# Patient Record
Sex: MALE | Race: WHITE | NOT HISPANIC OR LATINO | Employment: FULL TIME | URBAN - METROPOLITAN AREA
[De-identification: names, ages, dates, MRNs, and addresses within clinical notes are randomized per-mention and may not be internally consistent; named-entity substitution may affect disease eponyms.]

---

## 2023-02-04 PROBLEM — E55.9 VITAMIN D DEFICIENCY: Status: ACTIVE | Noted: 2023-02-04

## 2023-02-04 PROBLEM — E87.6 HYPOKALEMIA: Status: ACTIVE | Noted: 2023-02-04

## 2023-02-04 PROBLEM — R07.89 ATYPICAL CHEST PAIN: Status: ACTIVE | Noted: 2023-02-04

## 2023-02-04 PROBLEM — I10 HYPERTENSION: Status: ACTIVE | Noted: 2023-02-04

## 2023-02-04 PROBLEM — C34.90 MALIGNANT NEOPLASM OF LUNG (MULTI): Status: ACTIVE | Noted: 2023-02-04

## 2023-02-04 PROBLEM — L30.9 ECZEMA: Status: ACTIVE | Noted: 2023-02-04

## 2023-02-04 PROBLEM — R19.7 DIARRHEA: Status: ACTIVE | Noted: 2023-02-04

## 2023-02-04 PROBLEM — R53.83 FATIGUE: Status: ACTIVE | Noted: 2023-02-04

## 2023-02-04 PROBLEM — R73.01 ELEVATED FASTING BLOOD SUGAR: Status: ACTIVE | Noted: 2023-02-04

## 2023-02-04 PROBLEM — Z90.49 STATUS POST APPENDECTOMY: Status: ACTIVE | Noted: 2023-02-04

## 2023-02-04 PROBLEM — R00.0 TACHYCARDIA: Status: ACTIVE | Noted: 2023-02-04

## 2023-02-04 PROBLEM — M25.50 MULTIPLE JOINT PAIN: Status: ACTIVE | Noted: 2023-02-04

## 2023-02-04 PROBLEM — E66.9 OBESITY: Status: ACTIVE | Noted: 2023-02-04

## 2023-02-04 PROBLEM — Z20.822 CONTACT WITH AND (SUSPECTED) EXPOSURE TO COVID-19: Status: RESOLVED | Noted: 2023-02-04 | Resolved: 2023-02-04

## 2023-02-04 PROBLEM — R20.2 PARESTHESIAS: Status: ACTIVE | Noted: 2023-02-04

## 2023-02-04 PROBLEM — Z20.822 CONTACT WITH AND (SUSPECTED) EXPOSURE TO COVID-19: Status: ACTIVE | Noted: 2023-02-04

## 2023-02-04 PROBLEM — E79.0 HYPERURICEMIA: Status: ACTIVE | Noted: 2023-02-04

## 2023-02-04 PROBLEM — F41.9 ANXIETY: Status: ACTIVE | Noted: 2023-02-04

## 2023-02-04 PROBLEM — R50.9 FEVER: Status: ACTIVE | Noted: 2023-02-04

## 2023-02-04 PROBLEM — R05.9 COUGH: Status: ACTIVE | Noted: 2023-02-04

## 2023-02-04 PROBLEM — B35.3 TINEA PEDIS: Status: ACTIVE | Noted: 2023-02-04

## 2023-02-04 PROBLEM — R19.5 POSITIVE FECAL IMMUNOCHEMICAL TEST: Status: ACTIVE | Noted: 2023-02-04

## 2023-02-04 PROBLEM — E78.1 HIGH TRIGLYCERIDES: Status: ACTIVE | Noted: 2023-02-04

## 2023-02-04 PROBLEM — R00.2 HEART PALPITATIONS: Status: ACTIVE | Noted: 2023-02-04

## 2023-02-04 PROBLEM — R74.8 ELEVATED LIVER ENZYMES: Status: ACTIVE | Noted: 2023-02-04

## 2023-02-04 PROBLEM — E11.9 TYPE 2 DIABETES MELLITUS (MULTI): Status: ACTIVE | Noted: 2023-02-04

## 2023-02-04 PROBLEM — M10.9 GOUT: Status: ACTIVE | Noted: 2023-02-04

## 2023-02-04 PROBLEM — E78.5 HYPERLIPIDEMIA: Status: ACTIVE | Noted: 2023-02-04

## 2023-02-04 PROBLEM — R14.0 ABDOMINAL BLOATING: Status: ACTIVE | Noted: 2023-02-04

## 2023-02-04 PROBLEM — K21.9 GERD (GASTROESOPHAGEAL REFLUX DISEASE): Status: ACTIVE | Noted: 2023-02-04

## 2023-02-04 PROBLEM — K80.20 GALLSTONES: Status: ACTIVE | Noted: 2023-02-04

## 2023-02-04 PROBLEM — R19.5 POSITIVE COLORECTAL CANCER SCREENING USING COLOGUARD TEST: Status: ACTIVE | Noted: 2023-02-04

## 2023-02-04 PROBLEM — K70.0 ALCOHOLIC FATTY LIVER: Status: ACTIVE | Noted: 2023-02-04

## 2023-02-04 RX ORDER — METFORMIN HYDROCHLORIDE 500 MG/1
500 TABLET, EXTENDED RELEASE ORAL DAILY
COMMUNITY
Start: 2022-08-03 | End: 2023-05-25 | Stop reason: DRUGHIGH

## 2023-02-04 RX ORDER — ESOMEPRAZOLE MAGNESIUM 40 MG/1
40 CAPSULE, DELAYED RELEASE ORAL DAILY
COMMUNITY
Start: 2016-01-13 | End: 2023-10-30

## 2023-02-04 RX ORDER — METOPROLOL SUCCINATE 25 MG/1
25 TABLET, EXTENDED RELEASE ORAL DAILY
COMMUNITY
Start: 2020-08-24 | End: 2023-08-14

## 2023-02-04 RX ORDER — AMLODIPINE BESYLATE 5 MG/1
5 TABLET ORAL DAILY
COMMUNITY
Start: 2015-04-23 | End: 2023-09-25

## 2023-02-04 RX ORDER — CHLORTHALIDONE 25 MG/1
25 TABLET ORAL DAILY
COMMUNITY
Start: 2014-09-17 | End: 2023-04-11

## 2023-02-04 RX ORDER — LORAZEPAM 1 MG/1
1 TABLET ORAL 2 TIMES DAILY
COMMUNITY
Start: 2014-03-03 | End: 2023-03-29 | Stop reason: SDUPTHER

## 2023-02-04 RX ORDER — POTASSIUM CHLORIDE 750 MG/1
1 CAPSULE, EXTENDED RELEASE ORAL DAILY
COMMUNITY
Start: 2022-05-03 | End: 2023-04-17

## 2023-03-20 ENCOUNTER — APPOINTMENT (OUTPATIENT)
Dept: PRIMARY CARE | Facility: CLINIC | Age: 63
End: 2023-03-20
Payer: COMMERCIAL

## 2023-03-27 ENCOUNTER — TELEPHONE (OUTPATIENT)
Dept: PRIMARY CARE | Facility: CLINIC | Age: 63
End: 2023-03-27
Payer: COMMERCIAL

## 2023-03-27 DIAGNOSIS — F41.9 ANXIETY: ICD-10-CM

## 2023-03-29 RX ORDER — LORAZEPAM 1 MG/1
1 TABLET ORAL 2 TIMES DAILY
Qty: 60 TABLET | Refills: 1 | Status: SHIPPED | OUTPATIENT
Start: 2023-03-29 | End: 2023-08-28 | Stop reason: WASHOUT

## 2023-03-30 ENCOUNTER — APPOINTMENT (OUTPATIENT)
Dept: PRIMARY CARE | Facility: CLINIC | Age: 63
End: 2023-03-30
Payer: COMMERCIAL

## 2023-04-11 DIAGNOSIS — I10 PRIMARY HYPERTENSION: Primary | ICD-10-CM

## 2023-04-11 RX ORDER — CHLORTHALIDONE 25 MG/1
TABLET ORAL
Qty: 30 TABLET | Refills: 11 | Status: SHIPPED | OUTPATIENT
Start: 2023-04-11 | End: 2024-04-02

## 2023-04-15 DIAGNOSIS — I10 PRIMARY HYPERTENSION: Primary | ICD-10-CM

## 2023-04-17 RX ORDER — POTASSIUM CHLORIDE 750 MG/1
CAPSULE, EXTENDED RELEASE ORAL
Qty: 90 CAPSULE | Refills: 3 | Status: SHIPPED | OUTPATIENT
Start: 2023-04-17 | End: 2024-04-02

## 2023-05-17 ENCOUNTER — APPOINTMENT (OUTPATIENT)
Dept: PRIMARY CARE | Facility: CLINIC | Age: 63
End: 2023-05-17
Payer: COMMERCIAL

## 2023-05-24 PROBLEM — Z90.49 STATUS POST APPENDECTOMY: Status: RESOLVED | Noted: 2023-02-04 | Resolved: 2023-05-24

## 2023-05-24 PROBLEM — R53.83 FATIGUE: Status: RESOLVED | Noted: 2023-02-04 | Resolved: 2023-05-24

## 2023-05-24 PROBLEM — R07.89 ATYPICAL CHEST PAIN: Status: RESOLVED | Noted: 2023-02-04 | Resolved: 2023-05-24

## 2023-05-24 PROBLEM — R00.2 HEART PALPITATIONS: Status: RESOLVED | Noted: 2023-02-04 | Resolved: 2023-05-24

## 2023-05-24 PROBLEM — R20.2 PARESTHESIAS: Status: RESOLVED | Noted: 2023-02-04 | Resolved: 2023-05-24

## 2023-05-24 PROBLEM — R00.0 TACHYCARDIA: Status: RESOLVED | Noted: 2023-02-04 | Resolved: 2023-05-24

## 2023-05-24 PROBLEM — R19.7 DIARRHEA: Status: RESOLVED | Noted: 2023-02-04 | Resolved: 2023-05-24

## 2023-05-24 PROBLEM — R14.0 ABDOMINAL BLOATING: Status: RESOLVED | Noted: 2023-02-04 | Resolved: 2023-05-24

## 2023-05-24 PROBLEM — R05.9 COUGH: Status: RESOLVED | Noted: 2023-02-04 | Resolved: 2023-05-24

## 2023-05-24 PROBLEM — R50.9 FEVER: Status: RESOLVED | Noted: 2023-02-04 | Resolved: 2023-05-24

## 2023-05-25 ENCOUNTER — OFFICE VISIT (OUTPATIENT)
Dept: PRIMARY CARE | Facility: CLINIC | Age: 63
End: 2023-05-25
Payer: COMMERCIAL

## 2023-05-25 ENCOUNTER — LAB (OUTPATIENT)
Dept: LAB | Facility: LAB | Age: 63
End: 2023-05-25
Payer: COMMERCIAL

## 2023-05-25 VITALS
TEMPERATURE: 97.4 F | BODY MASS INDEX: 32.6 KG/M2 | WEIGHT: 214.4 LBS | OXYGEN SATURATION: 96 % | DIASTOLIC BLOOD PRESSURE: 80 MMHG | HEART RATE: 99 BPM | SYSTOLIC BLOOD PRESSURE: 148 MMHG

## 2023-05-25 DIAGNOSIS — E11.9 TYPE 2 DIABETES MELLITUS WITHOUT COMPLICATION, WITHOUT LONG-TERM CURRENT USE OF INSULIN (MULTI): ICD-10-CM

## 2023-05-25 DIAGNOSIS — E11.9 TYPE 2 DIABETES MELLITUS WITHOUT COMPLICATION, WITHOUT LONG-TERM CURRENT USE OF INSULIN (MULTI): Primary | ICD-10-CM

## 2023-05-25 DIAGNOSIS — R19.5 POSITIVE FIT (FECAL IMMUNOCHEMICAL TEST): ICD-10-CM

## 2023-05-25 DIAGNOSIS — E78.1 HIGH TRIGLYCERIDES: ICD-10-CM

## 2023-05-25 DIAGNOSIS — K70.0 ALCOHOLIC FATTY LIVER: ICD-10-CM

## 2023-05-25 DIAGNOSIS — E78.2 MIXED HYPERLIPIDEMIA: ICD-10-CM

## 2023-05-25 DIAGNOSIS — Z12.5 PROSTATE CANCER SCREENING: ICD-10-CM

## 2023-05-25 DIAGNOSIS — F41.9 ANXIETY: ICD-10-CM

## 2023-05-25 DIAGNOSIS — R73.01 ELEVATED FASTING BLOOD SUGAR: ICD-10-CM

## 2023-05-25 LAB
ALANINE AMINOTRANSFERASE (SGPT) (U/L) IN SER/PLAS: 19 U/L (ref 10–52)
ALBUMIN (G/DL) IN SER/PLAS: 4.4 G/DL (ref 3.4–5)
ALKALINE PHOSPHATASE (U/L) IN SER/PLAS: 80 U/L (ref 33–136)
ANION GAP IN SER/PLAS: 15 MMOL/L (ref 10–20)
ASPARTATE AMINOTRANSFERASE (SGOT) (U/L) IN SER/PLAS: 18 U/L (ref 9–39)
BILIRUBIN TOTAL (MG/DL) IN SER/PLAS: 3.2 MG/DL (ref 0–1.2)
CALCIUM (MG/DL) IN SER/PLAS: 9.4 MG/DL (ref 8.6–10.3)
CARBON DIOXIDE, TOTAL (MMOL/L) IN SER/PLAS: 33 MMOL/L (ref 21–32)
CHLORIDE (MMOL/L) IN SER/PLAS: 87 MMOL/L (ref 98–107)
CHOLESTEROL (MG/DL) IN SER/PLAS: 187 MG/DL (ref 0–199)
CHOLESTEROL IN HDL (MG/DL) IN SER/PLAS: 46.8 MG/DL
CHOLESTEROL/HDL RATIO: 4
CREATININE (MG/DL) IN SER/PLAS: 0.89 MG/DL (ref 0.5–1.3)
ESTIMATED AVERAGE GLUCOSE FOR HBA1C: 249 MG/DL
GFR MALE: >90 ML/MIN/1.73M2
GLUCOSE (MG/DL) IN SER/PLAS: 351 MG/DL (ref 74–99)
HEMOGLOBIN A1C/HEMOGLOBIN TOTAL IN BLOOD: 10.3 %
LDL: ABNORMAL MG/DL (ref 0–99)
NON HDL CHOLESTEROL: 140 MG/DL
POTASSIUM (MMOL/L) IN SER/PLAS: 3.1 MMOL/L (ref 3.5–5.3)
PROSTATE SPECIFIC AG (NG/ML) IN SER/PLAS: 0.84 NG/ML (ref 0–4)
PROTEIN TOTAL: 7.3 G/DL (ref 6.4–8.2)
SODIUM (MMOL/L) IN SER/PLAS: 132 MMOL/L (ref 136–145)
TRIGLYCERIDE (MG/DL) IN SER/PLAS: 448 MG/DL (ref 0–149)
UREA NITROGEN (MG/DL) IN SER/PLAS: 13 MG/DL (ref 6–23)
VLDL: ABNORMAL MG/DL (ref 0–40)

## 2023-05-25 PROCEDURE — 99214 OFFICE O/P EST MOD 30 MIN: CPT | Performed by: FAMILY MEDICINE

## 2023-05-25 PROCEDURE — 3079F DIAST BP 80-89 MM HG: CPT | Performed by: FAMILY MEDICINE

## 2023-05-25 PROCEDURE — 83036 HEMOGLOBIN GLYCOSYLATED A1C: CPT

## 2023-05-25 PROCEDURE — 3077F SYST BP >= 140 MM HG: CPT | Performed by: FAMILY MEDICINE

## 2023-05-25 PROCEDURE — 84153 ASSAY OF PSA TOTAL: CPT

## 2023-05-25 PROCEDURE — 36415 COLL VENOUS BLD VENIPUNCTURE: CPT

## 2023-05-25 PROCEDURE — 80053 COMPREHEN METABOLIC PANEL: CPT

## 2023-05-25 PROCEDURE — 80061 LIPID PANEL: CPT

## 2023-05-25 RX ORDER — METFORMIN HYDROCHLORIDE 500 MG/1
1000 TABLET, EXTENDED RELEASE ORAL
Qty: 60 TABLET | Refills: 11 | Status: SHIPPED | OUTPATIENT
Start: 2023-05-25 | End: 2023-06-20

## 2023-05-25 RX ORDER — METFORMIN HYDROCHLORIDE 500 MG/1
1000 TABLET ORAL
Qty: 120 TABLET | Refills: 11 | Status: CANCELLED | OUTPATIENT
Start: 2023-05-25 | End: 2024-05-24

## 2023-05-25 ASSESSMENT — ENCOUNTER SYMPTOMS: ENDOCRINE COMMENTS: SEE HPI

## 2023-05-25 NOTE — PROGRESS NOTES
Subjective   Patient ID: Carolina Ortiz is a 62 y.o. male who presents for 3 month follow up.  OARRS Report Last Screening Date: May 25, 2023  I have personally reviewed the OARRS report for CAROLINA ORTIZ. I have considered the risks of abuse, dependence, addiction and diversion. I believe that it is clinically appropriate for this patient to be prescribed this medication based on documented diagnosis.   OARRS report is initialed/dated and scanned into the electronic medical record.   Last urine drug screening date/ordered today: August 3, 2022   Controlled Substance Agreement:   I have printed this form and reviewed each line item with the patient and the patient has verbalized understanding.   Date of the last Controlled Substance Agreement: August 3, 2022   BENZODIAZEPINES   MELISSA-7   1. Feeling nervous, anxious or on edge- more than half the days  2. Not being able to stop or control worrying - not at all  3. Worrying too much about different things - not at all  4. Trouble relaxing - not at all  5. Being so restless that it is hard to sit still - not at all  6. Becoming easily annoyed or irritable - not at all  7. Feeling afraid as if something awful might happen - not at all  Total Score = 2  Activities of Daily Living:   Yes, it is my opinion that this patient is benefitting from benzodiazepine therapy.   Physical functioning: Same   Family relationships: Same   Social relationships: Better   Mood: Better   Sleep patterns: Same   Overall functioning: Better   Referrals or Alternatives: None  He is here today to follow-up on generalized anxiety, hypertension, hyperlipidemia, hyperuricemia, GERD, type 2 diabetes, history of lung cancer, and recently a positive fecal immunochemistry test.    He has been taking lorazepam twice a day and we discussed weaning off it a bit.  He was in agreement.    Because of the positive fit test we will need to refer him for colonoscopy.    Since I saw him he has lost a lot  of weight and we will recheck his sugar numbers today.         Review of Systems   Cardiovascular:         See HPI   Endocrine:        See HPI   Psychiatric/Behavioral:          See HPI       Objective   /80 (BP Location: Left arm, Patient Position: Sitting, BP Cuff Size: Adult)   Pulse 99   Temp 36.3 °C (97.4 °F) (Skin)   Wt 97.3 kg (214 lb 6.4 oz)   SpO2 96%   BMI 32.60 kg/m²     Physical Exam  Constitutional:       Appearance: Normal appearance.   HENT:      Head: Normocephalic and atraumatic.   Cardiovascular:      Rate and Rhythm: Normal rate and regular rhythm.   Pulmonary:      Effort: Pulmonary effort is normal.      Breath sounds: Normal breath sounds.   Musculoskeletal:      Cervical back: Normal range of motion and neck supple.   Neurological:      General: No focal deficit present.      Mental Status: He is alert and oriented to person, place, and time.   Psychiatric:         Mood and Affect: Mood normal.         Behavior: Behavior normal.         Thought Content: Thought content normal.         Judgment: Judgment normal.         Assessment/Plan   Problem List Items Addressed This Visit       Alcoholic fatty liver    Anxiety    Elevated fasting blood sugar    High triglycerides    Hyperlipidemia    Type 2 diabetes mellitus (CMS/HCC) - Primary    Relevant Orders    Comprehensive Metabolic Panel    Hemoglobin A1C    Lipid Panel     Other Visit Diagnoses       Prostate cancer screening        Relevant Orders    Prostate Specific Antigen    Positive FIT (fecal immunochemical test)        Relevant Orders    Referral to Gastroenterology        I am updating chemistry today and we will follow-up with him in 3 months.  He is going to drop the lorazepam down to 0.5 mg twice a day for 3 weeks and then once a day for 3 weeks then discontinue.

## 2023-05-25 NOTE — PATIENT INSTRUCTIONS
I will update your chemistry today and notify you of those results.  You are gradually going to taper off the lorazepam and if you start to experience the rapid heartbeats at night, let me know and I can put you on a medication at bedtime to counteract that.  I put in a referral for you to see Dr. Bang Agee who is a gastroenterologist who sees people in our office.  The referral is for colonoscopy due to the positive occult blood test.  I will see you back in 3 months

## 2023-05-25 NOTE — TELEPHONE ENCOUNTER
Patient notified of test results.  Told to double his dosage of metformin and sent in a new prescription for approval.  Gave phone number to schedule both the GI and Endocrine appointments.

## 2023-05-25 NOTE — TELEPHONE ENCOUNTER
----- Message from Adilson Ledesma MD sent at 5/25/2023  4:08 PM EDT -----  His glucose is not good and we need to refer him to endocrinology for their advice on further treatment.  In the meantime have him double his metformin I will put in the referral.

## 2023-05-26 ENCOUNTER — PATIENT MESSAGE (OUTPATIENT)
Dept: PRIMARY CARE | Facility: CLINIC | Age: 63
End: 2023-05-26
Payer: COMMERCIAL

## 2023-06-20 DIAGNOSIS — E11.9 TYPE 2 DIABETES MELLITUS WITHOUT COMPLICATION, WITHOUT LONG-TERM CURRENT USE OF INSULIN (MULTI): Primary | ICD-10-CM

## 2023-06-20 RX ORDER — METFORMIN HYDROCHLORIDE 500 MG/1
500 TABLET ORAL
Qty: 60 TABLET | Refills: 11 | Status: SHIPPED | OUTPATIENT
Start: 2023-06-20 | End: 2024-06-04

## 2023-08-12 DIAGNOSIS — I10 PRIMARY HYPERTENSION: Primary | ICD-10-CM

## 2023-08-14 RX ORDER — METOPROLOL SUCCINATE 25 MG/1
25 TABLET, EXTENDED RELEASE ORAL DAILY
Qty: 90 TABLET | Refills: 1 | Status: SHIPPED | OUTPATIENT
Start: 2023-08-14 | End: 2024-01-31

## 2023-08-24 ASSESSMENT — ENCOUNTER SYMPTOMS
BLACKOUTS: 0
SEIZURES: 0
NERVOUS/ANXIOUS: 0
VISUAL CHANGE: 0
SPEECH DIFFICULTY: 0
SWEATS: 0
DIZZINESS: 0
WEIGHT LOSS: 0
FATIGUE: 1
BLURRED VISION: 0
POLYDIPSIA: 0
CONFUSION: 0
HEADACHES: 0
POLYPHAGIA: 0
TREMORS: 0
HUNGER: 0
WEAKNESS: 0

## 2023-08-28 PROBLEM — C34.90 MALIGNANT NEOPLASM OF LUNG (MULTI): Status: RESOLVED | Noted: 2023-02-04 | Resolved: 2023-08-28

## 2023-08-28 PROBLEM — R19.5 POSITIVE COLORECTAL CANCER SCREENING USING COLOGUARD TEST: Status: RESOLVED | Noted: 2023-02-04 | Resolved: 2023-08-28

## 2023-08-28 PROBLEM — R73.01 ELEVATED FASTING BLOOD SUGAR: Status: RESOLVED | Noted: 2023-02-04 | Resolved: 2023-08-28

## 2023-08-29 ENCOUNTER — LAB (OUTPATIENT)
Dept: LAB | Facility: LAB | Age: 63
End: 2023-08-29
Payer: COMMERCIAL

## 2023-08-29 ENCOUNTER — OFFICE VISIT (OUTPATIENT)
Dept: PRIMARY CARE | Facility: CLINIC | Age: 63
End: 2023-08-29
Payer: COMMERCIAL

## 2023-08-29 VITALS
WEIGHT: 221.2 LBS | HEART RATE: 68 BPM | DIASTOLIC BLOOD PRESSURE: 68 MMHG | BODY MASS INDEX: 33.63 KG/M2 | OXYGEN SATURATION: 95 % | TEMPERATURE: 96.8 F | SYSTOLIC BLOOD PRESSURE: 122 MMHG

## 2023-08-29 DIAGNOSIS — E11.9 TYPE 2 DIABETES MELLITUS WITHOUT COMPLICATION, WITHOUT LONG-TERM CURRENT USE OF INSULIN (MULTI): ICD-10-CM

## 2023-08-29 DIAGNOSIS — E11.9 TYPE 2 DIABETES MELLITUS WITHOUT COMPLICATION, WITHOUT LONG-TERM CURRENT USE OF INSULIN (MULTI): Primary | ICD-10-CM

## 2023-08-29 LAB
ALANINE AMINOTRANSFERASE (SGPT) (U/L) IN SER/PLAS: 18 U/L (ref 10–52)
ALBUMIN (G/DL) IN SER/PLAS: 4.3 G/DL (ref 3.4–5)
ALBUMIN (MG/L) IN URINE: 97.9 MG/L
ALBUMIN/CREATININE (UG/MG) IN URINE: 195.4 UG/MG CRT (ref 0–30)
ALKALINE PHOSPHATASE (U/L) IN SER/PLAS: 69 U/L (ref 33–136)
ANION GAP IN SER/PLAS: 10 MMOL/L (ref 10–20)
APPEARANCE, URINE: CLEAR
ASPARTATE AMINOTRANSFERASE (SGOT) (U/L) IN SER/PLAS: 15 U/L (ref 9–39)
BILIRUBIN TOTAL (MG/DL) IN SER/PLAS: 1.3 MG/DL (ref 0–1.2)
BILIRUBIN, URINE: NEGATIVE
BLOOD, URINE: NEGATIVE
CALCIUM (MG/DL) IN SER/PLAS: 9.3 MG/DL (ref 8.6–10.3)
CARBON DIOXIDE, TOTAL (MMOL/L) IN SER/PLAS: 33 MMOL/L (ref 21–32)
CHLORIDE (MMOL/L) IN SER/PLAS: 97 MMOL/L (ref 98–107)
COLOR, URINE: YELLOW
CREATININE (MG/DL) IN SER/PLAS: 0.93 MG/DL (ref 0.5–1.3)
CREATININE (MG/DL) IN URINE: 50.1 MG/DL (ref 20–370)
ESTIMATED AVERAGE GLUCOSE FOR HBA1C: 148 MG/DL
GFR MALE: >90 ML/MIN/1.73M2
GLUCOSE (MG/DL) IN SER/PLAS: 161 MG/DL (ref 74–99)
GLUCOSE, URINE: NEGATIVE MG/DL
HEMOGLOBIN A1C/HEMOGLOBIN TOTAL IN BLOOD: 6.8 %
KETONES, URINE: NEGATIVE MG/DL
LEUKOCYTE ESTERASE, URINE: NEGATIVE
NITRITE, URINE: NEGATIVE
PH, URINE: 7 (ref 5–8)
POTASSIUM (MMOL/L) IN SER/PLAS: 3.3 MMOL/L (ref 3.5–5.3)
PROTEIN TOTAL: 6.9 G/DL (ref 6.4–8.2)
PROTEIN, URINE: ABNORMAL MG/DL
RBC, URINE: <1 /HPF (ref 0–5)
SODIUM (MMOL/L) IN SER/PLAS: 137 MMOL/L (ref 136–145)
SPECIFIC GRAVITY, URINE: 1.01 (ref 1–1.03)
UREA NITROGEN (MG/DL) IN SER/PLAS: 15 MG/DL (ref 6–23)
UROBILINOGEN, URINE: <2 MG/DL (ref 0–1.9)
WBC, URINE: NORMAL /HPF (ref 0–5)

## 2023-08-29 PROCEDURE — 80061 LIPID PANEL: CPT

## 2023-08-29 PROCEDURE — 36415 COLL VENOUS BLD VENIPUNCTURE: CPT

## 2023-08-29 PROCEDURE — 3044F HG A1C LEVEL LT 7.0%: CPT | Performed by: FAMILY MEDICINE

## 2023-08-29 PROCEDURE — 3046F HEMOGLOBIN A1C LEVEL >9.0%: CPT | Performed by: FAMILY MEDICINE

## 2023-08-29 PROCEDURE — 3078F DIAST BP <80 MM HG: CPT | Performed by: FAMILY MEDICINE

## 2023-08-29 PROCEDURE — 3074F SYST BP LT 130 MM HG: CPT | Performed by: FAMILY MEDICINE

## 2023-08-29 PROCEDURE — 83036 HEMOGLOBIN GLYCOSYLATED A1C: CPT

## 2023-08-29 PROCEDURE — 1036F TOBACCO NON-USER: CPT | Performed by: FAMILY MEDICINE

## 2023-08-29 PROCEDURE — 81001 URINALYSIS AUTO W/SCOPE: CPT

## 2023-08-29 PROCEDURE — 80053 COMPREHEN METABOLIC PANEL: CPT

## 2023-08-29 PROCEDURE — 99214 OFFICE O/P EST MOD 30 MIN: CPT | Performed by: FAMILY MEDICINE

## 2023-08-29 RX ORDER — ROSUVASTATIN CALCIUM 20 MG/1
20 TABLET, COATED ORAL DAILY
COMMUNITY
Start: 2023-08-28 | End: 2024-01-02 | Stop reason: SDUPTHER

## 2023-08-29 ASSESSMENT — ENCOUNTER SYMPTOMS
CONFUSION: 0
WEAKNESS: 0
WEIGHT LOSS: 0
BLURRED VISION: 0
OCCASIONAL FEELINGS OF UNSTEADINESS: 0
VISUAL CHANGE: 0
LOSS OF SENSATION IN FEET: 1
HUNGER: 0
FATIGUE: 1
BLACKOUTS: 0
NERVOUS/ANXIOUS: 0
SWEATS: 0
SPEECH DIFFICULTY: 0
HEADACHES: 0
SEIZURES: 0
TREMORS: 0
POLYPHAGIA: 0
DEPRESSION: 0
POLYDIPSIA: 0
DIZZINESS: 0

## 2023-08-29 ASSESSMENT — PATIENT HEALTH QUESTIONNAIRE - PHQ9
1. LITTLE INTEREST OR PLEASURE IN DOING THINGS: NOT AT ALL
2. FEELING DOWN, DEPRESSED OR HOPELESS: NOT AT ALL
SUM OF ALL RESPONSES TO PHQ9 QUESTIONS 1 AND 2: 0

## 2023-08-29 NOTE — PROGRESS NOTES
Subjective   Patient ID: Silvestre Ortiz is a 62 y.o. male who presents for 3 month follow up.    He is here today to follow-up on hypertension, type 2 diabetes, hyperlipidemia, GERD, and alcoholic fatty liver.  He has not had any alcohol for 3 months and he was able to wean off the lorazepam.  His blood pressure looks great.    Diabetes  He has type 2 diabetes mellitus. No MedicAlert identification noted. The initial diagnosis of diabetes was made 1 years ago. Pertinent negatives for hypoglycemia include no confusion, dizziness, headaches, hunger, mood changes, nervousness/anxiousness, pallor, seizures, sleepiness, speech difficulty, sweats or tremors. Associated symptoms include fatigue, foot paresthesias and polyuria. Pertinent negatives for diabetes include no blurred vision, no chest pain, no foot ulcerations, no polydipsia, no polyphagia, no visual change, no weakness and no weight loss. Pertinent negatives for hypoglycemia complications include no blackouts, no hospitalization, no nocturnal hypoglycemia, no required assistance and no required glucagon injection. Symptoms are improving. Pertinent negatives for diabetic complications include no CVA, heart disease, impotence, nephropathy, peripheral neuropathy, PVD or retinopathy. There are no known risk factors for coronary artery disease. Current diabetic treatment includes oral agent (monotherapy). He is compliant with treatment all of the time. His weight is stable. He is following a generally unhealthy diet. When asked about meal planning, he reported none. He has not had a previous visit with a dietitian. He rarely participates in exercise. He monitors blood glucose at home 3-4 x per week. He monitors urine at home <1 x per month. Blood glucose monitoring compliance is fair. His home blood glucose trend is fluctuating minimally. His breakfast blood glucose is taken after 10 am. His breakfast blood glucose range is generally 180-200 mg/dl. His lunch  blood glucose is taken between 11-12 pm. His lunch blood glucose range is generally 180-200 mg/dl. His dinner blood glucose is taken between 6-7 pm. His dinner blood glucose range is generally 140-180 mg/dl. His overall blood glucose range is 180-200 mg/dl. He does not see a podiatrist.Eye exam is current.        Review of Systems   Constitutional:  Positive for fatigue. Negative for weight loss.   Eyes:  Negative for blurred vision.   Cardiovascular:  Negative for chest pain.   Endocrine: Positive for polyuria. Negative for polydipsia and polyphagia.   Genitourinary:  Negative for impotence.   Skin:  Negative for pallor.   Neurological:  Negative for dizziness, tremors, seizures, speech difficulty, weakness and headaches.   Psychiatric/Behavioral:  Negative for confusion. The patient is not nervous/anxious.        Objective   /68 (BP Location: Left arm, Patient Position: Sitting, BP Cuff Size: Adult)   Pulse 68   Temp 36 °C (96.8 °F) (Skin)   Wt 100 kg (221 lb 3.2 oz)   SpO2 95%   BMI 33.63 kg/m²     Physical Exam  Constitutional:       Appearance: Normal appearance.   HENT:      Head: Normocephalic and atraumatic.      Nose: Nose normal.   Cardiovascular:      Rate and Rhythm: Normal rate and regular rhythm.   Pulmonary:      Effort: Pulmonary effort is normal.      Breath sounds: Normal breath sounds.   Abdominal:      General: Abdomen is flat.      Palpations: Abdomen is soft.   Musculoskeletal:      Cervical back: Normal range of motion and neck supple.   Neurological:      General: No focal deficit present.      Mental Status: He is alert and oriented to person, place, and time.   Psychiatric:         Mood and Affect: Mood normal.         Behavior: Behavior normal.         Thought Content: Thought content normal.         Judgment: Judgment normal.         Assessment/Plan   Problem List Items Addressed This Visit       Type 2 diabetes mellitus (CMS/HCC) - Primary     Not controlled and will be seeing  endocrinology tomorrow         Relevant Orders    Hemoglobin A1C    Comprehensive Metabolic Panel    Urinalysis with Reflex Microscopic   All in all he is doing well and I will recheck his chemistry as a relates to his diabetes.

## 2023-08-29 NOTE — PATIENT INSTRUCTIONS
I will text you when I have a chance to review your blood test results.  Contact your gastroenterologist who has done your colonoscopies in the past to update things.  Regards, HO

## 2023-08-30 ENCOUNTER — TELEPHONE (OUTPATIENT)
Dept: PRIMARY CARE | Facility: CLINIC | Age: 63
End: 2023-08-30
Payer: COMMERCIAL

## 2023-08-30 LAB
CHOLESTEROL (MG/DL) IN SER/PLAS: 173 MG/DL (ref 0–199)
CHOLESTEROL IN HDL (MG/DL) IN SER/PLAS: 39 MG/DL
CHOLESTEROL/HDL RATIO: 4.4
LDL: 78 MG/DL (ref 0–99)
NON HDL CHOLESTEROL: 134 MG/DL
TRIGLYCERIDE (MG/DL) IN SER/PLAS: 281 MG/DL (ref 0–149)
VLDL: 56 MG/DL (ref 0–40)

## 2023-09-25 DIAGNOSIS — I10 PRIMARY HYPERTENSION: Primary | ICD-10-CM

## 2023-09-25 RX ORDER — AMLODIPINE BESYLATE 5 MG/1
5 TABLET ORAL DAILY
Qty: 30 TABLET | Refills: 2 | Status: SHIPPED | OUTPATIENT
Start: 2023-09-25 | End: 2024-01-05

## 2023-10-03 DIAGNOSIS — Z12.11 SCREENING FOR COLON CANCER: ICD-10-CM

## 2023-10-03 RX ORDER — POLYETHYLENE GLYCOL 3350, SODIUM SULFATE ANHYDROUS, SODIUM BICARBONATE, SODIUM CHLORIDE, POTASSIUM CHLORIDE 236; 22.74; 6.74; 5.86; 2.97 G/4L; G/4L; G/4L; G/4L; G/4L
POWDER, FOR SOLUTION ORAL
Qty: 4000 ML | Refills: 0 | Status: SHIPPED | OUTPATIENT
Start: 2023-10-03 | End: 2024-01-02 | Stop reason: ALTCHOICE

## 2023-10-30 DIAGNOSIS — K21.9 GASTROESOPHAGEAL REFLUX DISEASE, UNSPECIFIED WHETHER ESOPHAGITIS PRESENT: Primary | ICD-10-CM

## 2023-10-30 RX ORDER — ESOMEPRAZOLE MAGNESIUM 40 MG/1
40 CAPSULE, DELAYED RELEASE ORAL DAILY
Qty: 90 CAPSULE | Refills: 0 | Status: SHIPPED | OUTPATIENT
Start: 2023-10-30 | End: 2024-01-29

## 2023-11-13 ENCOUNTER — APPOINTMENT (OUTPATIENT)
Dept: GASTROENTEROLOGY | Facility: EXTERNAL LOCATION | Age: 63
End: 2023-11-13
Payer: COMMERCIAL

## 2023-12-04 ENCOUNTER — APPOINTMENT (OUTPATIENT)
Dept: GASTROENTEROLOGY | Facility: EXTERNAL LOCATION | Age: 63
End: 2023-12-04
Payer: COMMERCIAL

## 2023-12-04 ENCOUNTER — OFFICE VISIT (OUTPATIENT)
Dept: OTOLARYNGOLOGY | Facility: CLINIC | Age: 63
End: 2023-12-04
Payer: COMMERCIAL

## 2023-12-04 VITALS
OXYGEN SATURATION: 99 % | SYSTOLIC BLOOD PRESSURE: 143 MMHG | WEIGHT: 226 LBS | HEIGHT: 68 IN | DIASTOLIC BLOOD PRESSURE: 85 MMHG | RESPIRATION RATE: 16 BRPM | BODY MASS INDEX: 34.25 KG/M2 | TEMPERATURE: 97.7 F | HEART RATE: 100 BPM

## 2023-12-04 DIAGNOSIS — H69.93 DYSFUNCTION OF BOTH EUSTACHIAN TUBES: ICD-10-CM

## 2023-12-04 DIAGNOSIS — H93.8X2 SENSATION OF FULLNESS IN LEFT EAR: Primary | ICD-10-CM

## 2023-12-04 DIAGNOSIS — H93.13 TINNITUS OF BOTH EARS: ICD-10-CM

## 2023-12-04 PROCEDURE — 99203 OFFICE O/P NEW LOW 30 MIN: CPT | Performed by: STUDENT IN AN ORGANIZED HEALTH CARE EDUCATION/TRAINING PROGRAM

## 2023-12-04 PROCEDURE — 3079F DIAST BP 80-89 MM HG: CPT | Performed by: STUDENT IN AN ORGANIZED HEALTH CARE EDUCATION/TRAINING PROGRAM

## 2023-12-04 PROCEDURE — 99213 OFFICE O/P EST LOW 20 MIN: CPT | Performed by: STUDENT IN AN ORGANIZED HEALTH CARE EDUCATION/TRAINING PROGRAM

## 2023-12-04 PROCEDURE — 1036F TOBACCO NON-USER: CPT | Performed by: STUDENT IN AN ORGANIZED HEALTH CARE EDUCATION/TRAINING PROGRAM

## 2023-12-04 PROCEDURE — 3044F HG A1C LEVEL LT 7.0%: CPT | Performed by: STUDENT IN AN ORGANIZED HEALTH CARE EDUCATION/TRAINING PROGRAM

## 2023-12-04 PROCEDURE — 3077F SYST BP >= 140 MM HG: CPT | Performed by: STUDENT IN AN ORGANIZED HEALTH CARE EDUCATION/TRAINING PROGRAM

## 2023-12-04 RX ORDER — OXYMETAZOLINE HYDROCHLORIDE 0.05 G/100ML
1 SPRAY, METERED NASAL 2 TIMES DAILY
Qty: 30 ML | Refills: 0 | Status: SHIPPED | OUTPATIENT
Start: 2023-12-04 | End: 2024-01-02

## 2023-12-04 RX ORDER — FLUTICASONE PROPIONATE 50 MCG
1 SPRAY, SUSPENSION (ML) NASAL 2 TIMES DAILY
Qty: 16 G | Refills: 11 | Status: SHIPPED | OUTPATIENT
Start: 2023-12-04 | End: 2024-01-02

## 2023-12-04 ASSESSMENT — PATIENT HEALTH QUESTIONNAIRE - PHQ9
SUM OF ALL RESPONSES TO PHQ9 QUESTIONS 1 AND 2: 0
1. LITTLE INTEREST OR PLEASURE IN DOING THINGS: NOT AT ALL
2. FEELING DOWN, DEPRESSED OR HOPELESS: NOT AT ALL

## 2023-12-04 ASSESSMENT — COLUMBIA-SUICIDE SEVERITY RATING SCALE - C-SSRS
6. HAVE YOU EVER DONE ANYTHING, STARTED TO DO ANYTHING, OR PREPARED TO DO ANYTHING TO END YOUR LIFE?: NO
1. IN THE PAST MONTH, HAVE YOU WISHED YOU WERE DEAD OR WISHED YOU COULD GO TO SLEEP AND NOT WAKE UP?: NO
2. HAVE YOU ACTUALLY HAD ANY THOUGHTS OF KILLING YOURSELF?: NO

## 2023-12-04 ASSESSMENT — PAIN SCALES - GENERAL: PAINLEVEL: 0-NO PAIN

## 2023-12-04 ASSESSMENT — ENCOUNTER SYMPTOMS
DEPRESSION: 0
OCCASIONAL FEELINGS OF UNSTEADINESS: 0
LOSS OF SENSATION IN FEET: 0

## 2023-12-04 NOTE — PATIENT INSTRUCTIONS
Use a combination of oxymetazoline and fluticasone nasal sprays (1 spray of each in both nostrils, twice a day). After two weeks stop using the oxymetazoline spray but continue fluticasone. See me again in 6-8 weeks.    NASAL STEROID SPRAY INSTRUCTIONS    Please take the prescribed nasal spray as directed. BE SURE TO POINT THE SPRAY TOWARDS THE CORNER OF THE EYE ON THE SAME SIDE NOSTRIL. This will ensure you are treating the appropriate parts of your nose that are swollen or inflamed.

## 2023-12-04 NOTE — PROGRESS NOTES
SUBJECTIVE  Patient ID: Silvestre Ortiz is a 63 y.o. male who presents for New Patient Visit (Ear blockage / left worsening).    Patient reports that he had a recent viral upper respiratory infection one month ago during Thanksgiving, with symptoms including cough, congestion, sore throat.  Symptoms are somewhat exacerbated by a flight after the holiday.  Since then, his symptoms have largely resolved but he has had a persistent sensation of fullness in the left ear. He reports that his hearing is muffled on both sides, but left side greater than right. At that time, he also reported some drainage from the left ear but that has resolved. He does report bilateral tinnitus which has been present for many years. He does not have any recent hearing test. He denies recent ear infections, vertigo, otalgia.     Review of Systems  Complete ROS negative except as noted above or on patient intake form and as above.    OBJECTIVE  Physical Exam  CONSTITUTIONAL: Well appearing male who appears stated age.  PSYCHIATRIC: Alert, appropriate mood and affect.  RESPIRATORY: Normal inspiration and expiration and chest wall expansion; no use of accessory muscles to breathe.  VOICE: Clear speech without hoarseness. No stridor nor stertor.  HEAD, FACE, AND SKIN: Symmetric facial feature. No cutaneous masses or lesions were visualized. The parotid and submandibular glands were normal to palpation.  EYES: Pupils were equal in size and reactive to light. Extra-ocular muscle function was intact. No nystagmus was observed. Vision was grossly intact.  EARS: External ears were normally formed with no lesions. The external auditory canals were clear. The tympanic membranes were intact and in the neutral position. No significant retraction pockets nor effusions were appreciated. The overall clarity of the drum is decreased, unable to visualize normal landmarks. Ear drum movements with valsalva maneuver bilaterally..  NOSE: Nasal dorsum was  midline. Anterior rhinoscopy demonstrated a midline septum. Inferior turbinates were not hypertrophied. No obvious nasal masses, polyps, mucopurulence, nor other lesions were appreciated.  ORAL CAVITY: Lips were without lesions. Moist mucous membranes. No lesions appreciated along the gingiva, oral mucosa, nor tongue.  DENTITION: Grossly normal without obvious infection nor inflammation.  OROPHARYNX: No lesion nor mucosal abnormality. The uvula was normal appearing.  NECK: Visualization and palpation of the neck revealed no mass lesions, no thyromegaly or thyroid masses. No cutaneous lesions appreciated.  LYMPHATICS (CERVICAL): There were no palpable lymph nodes in the posterior triangle, submandibular triangle, jugulodigastric region, nor central neck.    Kat lateralizes to the right at 256, 512, and 1024 Hz.  Rinne:  - Right:     - 256 Hz: Air greater than bone Conduction     - 512 Hz: Air greater than bone Conduction     - 1024 Hz: Air greater than bone Conduction  - Left:     - 256 Hz: Air greater than bone Conduction     - 512 Hz: Air greater than bone Conduction     - 1024 Hz: Air greater than bone Conduction     ASSESSMENT/PLAN  Diagnoses and all orders for this visit:  Dysfunction of both eustachian tubes  Sensation of fullness in left ear  -     fluticasone (Flonase) 50 mcg/actuation nasal spray; Administer 1 spray into each nostril 2 times a day. Shake gently. Before first use, prime pump. After use, clean tip and replace cap.  -     oxymetazoline (Afrin, oxymetazoline,) 0.05 % nasal spray; Administer 1 spray into each nostril 2 times a day for 14 days. Stop after two weeks. Use with fluticasone nasal spray.  Tinnitus of both ears    63 y.o. male who presents with post-viral bilateral ear fullness with a relatively unremarkable exam beyond tuning forks lateralizing to his less affected side. We will trial medical management of suspected eustachian tube dysfunction with two weeks of combined oxymetazoline  and fluticasone. We will also obtain an urgent audiogram to rule out a sudden sensorineural hearing loss, given his tuning fork examination.     Follow-up in 6-8 weeks for repeat evaluation, or sooner pending results of audiogram.     This note was created using speech recognition transcription software. Despite proofreading, typographical errors may be present that affect the meaning of the content. Please contact my office with any questions.

## 2023-12-11 ENCOUNTER — CLINICAL SUPPORT (OUTPATIENT)
Dept: AUDIOLOGY | Facility: CLINIC | Age: 63
End: 2023-12-11
Payer: COMMERCIAL

## 2023-12-11 DIAGNOSIS — H90.3 SENSORINEURAL HEARING LOSS (SNHL) OF BOTH EARS: Primary | ICD-10-CM

## 2023-12-11 PROCEDURE — 92557 COMPREHENSIVE HEARING TEST: CPT | Performed by: AUDIOLOGIST

## 2023-12-11 PROCEDURE — 92550 TYMPANOMETRY & REFLEX THRESH: CPT | Performed by: AUDIOLOGIST

## 2023-12-11 ASSESSMENT — PAIN - FUNCTIONAL ASSESSMENT: PAIN_FUNCTIONAL_ASSESSMENT: 0-10

## 2023-12-11 ASSESSMENT — PAIN SCALES - GENERAL: PAINLEVEL_OUTOF10: 0 - NO PAIN

## 2023-12-11 NOTE — PROGRESS NOTES
Name: Silvestre Ortiz  YOB: 1960  Age: 63 y.o.    Date of Evaluation:  12/11/2023     History:  Reason for visit:  Silvestre Ortiz is seen today at the request of Dr. Marcelo Collado for an evaluation of hearing.  Patient complains of Ear Fullness and Hearing Loss, worse in the left ear.  He had a cold a few weeks ago, and noticed the change in hearing and the fullness around that time.  He has a history of bilateral tinnitus.  Dizziness, ear infections, ear surgery, loud noise exposure, and family history of hearing loss were denied.    Evaluation:    Otoscopy revealed clear ear canal and tympanic membrane visualized bilaterally.  Immittance testing indicated normal middle ear pressure, mobility, and ear canal volume bilaterally.   Ipsilateral acoustic reflexes were present at 500-1000 Hz in the right ear; absent at 0865-5001 Hz in the right ear and at 500-4000 Hz in the left ear.    Behavioral hearing testing indicated normal hearing sloping to moderate sensorineural hearing loss in the right ear and mild to severe sensorineural hearing loss in the left ear.  Word recognition testing was completed using recorded speech at the patient's most comfortable level as documented on the audiogram.  Scores were excellent bilaterally.      Impression:    Testing indicated normal hearing sloping to moderate sensorineural hearing loss in the right ear and mild to severe sensorineural hearing loss in the left ear with normal middle ear pressure, mobility, and ear canal volume bilaterally.     Care Plan:    Follow-up with Marcelo Collado MD.  Retest hearing with otologic management, or annually to monitor.  Hearing aid evaluation if interested and pending medical clearance.    NO Mccarty, CCC-A  Audiologist    Time:  [phone]

## 2023-12-11 NOTE — LETTER
December 11, 2023     Marcelo Collado MD  [address] [facility] [address]  [facility] [address]    Patient: Silvestre Ortiz   YOB: 1960   Date of Visit: 12/11/2023       Dear Dr. Marcelo Collado MD:    Thank you for referring Silvestre Ortiz to me for evaluation. Below are my notes for this consultation.  If you have questions, please do not hesitate to call me. I look forward to following your patient along with you.       Sincerely,     Moriah Narayanan, NO, CCC-A      CC: No Recipients  ______________________________________________________________________________________    Name: Silvestre Ortiz  YOB: 1960  Age: 63 y.o.    Date of Evaluation:  12/11/2023     History:  Reason for visit:  Silvestre Ortiz is seen today at the request of Dr. Marcelo Collado for an evaluation of hearing.  Patient complains of Ear Fullness and Hearing Loss, worse in the left ear.  He had a cold a few weeks ago, and noticed the change in hearing and the fullness around that time.  He has a history of bilateral tinnitus.  Dizziness, ear infections, ear surgery, loud noise exposure, and family history of hearing loss were denied.    Evaluation:    Otoscopy revealed clear ear canal and tympanic membrane visualized bilaterally.  Immittance testing indicated normal middle ear pressure, mobility, and ear canal volume bilaterally.   Ipsilateral acoustic reflexes were present at 500-1000 Hz in the right ear; absent at 9737-8375 Hz in the right ear and at 500-4000 Hz in the left ear.    Behavioral hearing testing indicated normal hearing sloping to moderate sensorineural hearing loss in the right ear and mild to severe sensorineural hearing loss in the left ear.  Word recognition testing was completed using recorded speech at the patient's most comfortable level as documented on the audiogram.  Scores were excellent bilaterally.    Impression:    Testing indicated normal hearing  sloping to moderate sensorineural hearing loss in the right ear and mild to severe sensorineural hearing loss in the left ear with normal middle ear pressure, mobility, and ear canal volume bilaterally.     Care Plan:    Follow-up with Marcelo Collado MD.  Retest hearing with otologic management, or annually to monitor.  Hearing aid evaluation if interested and pending medical clearance.    NO Mccarty, CCC-A  Audiologist    Time:  [phone]

## 2023-12-12 ENCOUNTER — APPOINTMENT (OUTPATIENT)
Dept: OTOLARYNGOLOGY | Facility: CLINIC | Age: 63
End: 2023-12-12
Payer: COMMERCIAL

## 2023-12-12 ENCOUNTER — APPOINTMENT (OUTPATIENT)
Dept: AUDIOLOGY | Facility: CLINIC | Age: 63
End: 2023-12-12
Payer: COMMERCIAL

## 2023-12-19 ENCOUNTER — TELEPHONE (OUTPATIENT)
Dept: OTOLARYNGOLOGY | Facility: HOSPITAL | Age: 63
End: 2023-12-19
Payer: COMMERCIAL

## 2023-12-19 NOTE — TELEPHONE ENCOUNTER
After several attempts was able to contact patient to discuss the results of his recent audiogram. Unfortunately this does show an asymmetry between the right and left ear with the left ear suggesting a sudden SNHL. We discussed his options including continued observation vs steroid therapy. Recommended if he decides on therapy to try and initiate earlier rather than later; ideally in a 6 week timeframe. He is noting continued improvement in hearing and would like to wait for now. He will call if he decides to try steroid therapy.    Will arrange follow-up with audio in 6 months. If persistent asymmetry will need imaging.

## 2023-12-20 ENCOUNTER — OFFICE VISIT (OUTPATIENT)
Dept: GASTROENTEROLOGY | Facility: EXTERNAL LOCATION | Age: 63
End: 2023-12-20
Payer: COMMERCIAL

## 2023-12-20 DIAGNOSIS — Z86.010 PERSONAL HISTORY OF COLONIC POLYPS: ICD-10-CM

## 2023-12-20 DIAGNOSIS — K64.8 OTHER HEMORRHOIDS: ICD-10-CM

## 2023-12-20 DIAGNOSIS — Z12.11 ENCOUNTER FOR SCREENING FOR MALIGNANT NEOPLASM OF COLON: Primary | ICD-10-CM

## 2023-12-20 PROCEDURE — 1036F TOBACCO NON-USER: CPT | Performed by: INTERNAL MEDICINE

## 2023-12-20 PROCEDURE — G0105 COLORECTAL SCRN; HI RISK IND: HCPCS | Performed by: INTERNAL MEDICINE

## 2023-12-20 PROCEDURE — 3044F HG A1C LEVEL LT 7.0%: CPT | Performed by: INTERNAL MEDICINE

## 2024-01-02 ENCOUNTER — LAB (OUTPATIENT)
Dept: LAB | Facility: LAB | Age: 64
End: 2024-01-02
Payer: COMMERCIAL

## 2024-01-02 ENCOUNTER — OFFICE VISIT (OUTPATIENT)
Dept: PRIMARY CARE | Facility: CLINIC | Age: 64
End: 2024-01-02
Payer: COMMERCIAL

## 2024-01-02 VITALS
BODY MASS INDEX: 36.07 KG/M2 | SYSTOLIC BLOOD PRESSURE: 132 MMHG | TEMPERATURE: 96.8 F | HEART RATE: 87 BPM | WEIGHT: 237.2 LBS | DIASTOLIC BLOOD PRESSURE: 80 MMHG | OXYGEN SATURATION: 97 %

## 2024-01-02 DIAGNOSIS — E11.69 TYPE 2 DIABETES MELLITUS WITH OTHER SPECIFIED COMPLICATION, WITHOUT LONG-TERM CURRENT USE OF INSULIN (MULTI): Primary | ICD-10-CM

## 2024-01-02 DIAGNOSIS — E11.69 TYPE 2 DIABETES MELLITUS WITH OTHER SPECIFIED COMPLICATION, WITHOUT LONG-TERM CURRENT USE OF INSULIN (MULTI): ICD-10-CM

## 2024-01-02 DIAGNOSIS — I10 PRIMARY HYPERTENSION: ICD-10-CM

## 2024-01-02 DIAGNOSIS — M10.9 GOUT, UNSPECIFIED CAUSE, UNSPECIFIED CHRONICITY, UNSPECIFIED SITE: ICD-10-CM

## 2024-01-02 DIAGNOSIS — E78.2 MIXED HYPERLIPIDEMIA: ICD-10-CM

## 2024-01-02 DIAGNOSIS — E66.01 CLASS 2 SEVERE OBESITY DUE TO EXCESS CALORIES WITH SERIOUS COMORBIDITY AND BODY MASS INDEX (BMI) OF 36.0 TO 36.9 IN ADULT (MULTI): ICD-10-CM

## 2024-01-02 DIAGNOSIS — B35.3 TINEA PEDIS OF RIGHT FOOT: ICD-10-CM

## 2024-01-02 LAB
ALBUMIN SERPL BCP-MCNC: 4.4 G/DL (ref 3.4–5)
ALP SERPL-CCNC: 71 U/L (ref 33–136)
ALT SERPL W P-5'-P-CCNC: 23 U/L (ref 10–52)
ANION GAP SERPL CALC-SCNC: 16 MMOL/L
AST SERPL W P-5'-P-CCNC: 18 U/L (ref 9–39)
BILIRUB SERPL-MCNC: 1.4 MG/DL (ref 0–1.2)
BUN SERPL-MCNC: 13 MG/DL (ref 6–23)
CALCIUM SERPL-MCNC: 9.3 MG/DL (ref 8.6–10.3)
CHLORIDE SERPL-SCNC: 95 MMOL/L (ref 98–107)
CHOLEST SERPL-MCNC: 145 MG/DL (ref 0–199)
CHOLESTEROL/HDL RATIO: 2.5
CO2 SERPL-SCNC: 30 MMOL/L (ref 21–32)
CREAT SERPL-MCNC: 0.89 MG/DL (ref 0.5–1.3)
GFR SERPL CREATININE-BSD FRML MDRD: >90 ML/MIN/1.73M*2
GLUCOSE SERPL-MCNC: 179 MG/DL (ref 74–99)
HDLC SERPL-MCNC: 58.1 MG/DL
LDLC SERPL CALC-MCNC: ABNORMAL MG/DL
NON HDL CHOLESTEROL: 87 MG/DL (ref 0–149)
POTASSIUM SERPL-SCNC: 3.8 MMOL/L (ref 3.5–5.3)
PROT SERPL-MCNC: 7.1 G/DL (ref 6.4–8.2)
SODIUM SERPL-SCNC: 137 MMOL/L (ref 136–145)
TRIGL SERPL-MCNC: 496 MG/DL (ref 0–149)
URATE SERPL-MCNC: 6.3 MG/DL (ref 4–7.5)
VLDL: ABNORMAL

## 2024-01-02 PROCEDURE — 84550 ASSAY OF BLOOD/URIC ACID: CPT

## 2024-01-02 PROCEDURE — 83036 HEMOGLOBIN GLYCOSYLATED A1C: CPT

## 2024-01-02 PROCEDURE — 80053 COMPREHEN METABOLIC PANEL: CPT

## 2024-01-02 PROCEDURE — 83721 ASSAY OF BLOOD LIPOPROTEIN: CPT

## 2024-01-02 PROCEDURE — 36415 COLL VENOUS BLD VENIPUNCTURE: CPT

## 2024-01-02 PROCEDURE — 80061 LIPID PANEL: CPT

## 2024-01-02 RX ORDER — ROSUVASTATIN CALCIUM 20 MG/1
20 TABLET, COATED ORAL DAILY
Qty: 90 TABLET | Refills: 1 | Status: SHIPPED | OUTPATIENT
Start: 2024-01-02 | End: 2024-06-30

## 2024-01-02 RX ORDER — CLOTRIMAZOLE 1 %
CREAM (GRAM) TOPICAL 2 TIMES DAILY
Qty: 30 G | Refills: 0 | Status: SHIPPED | OUTPATIENT
Start: 2024-01-02 | End: 2024-05-01

## 2024-01-02 ASSESSMENT — PATIENT HEALTH QUESTIONNAIRE - PHQ9
2. FEELING DOWN, DEPRESSED OR HOPELESS: NOT AT ALL
1. LITTLE INTEREST OR PLEASURE IN DOING THINGS: NOT AT ALL
SUM OF ALL RESPONSES TO PHQ9 QUESTIONS 1 AND 2: 0

## 2024-01-02 ASSESSMENT — ENCOUNTER SYMPTOMS
DEPRESSION: 0
LOSS OF SENSATION IN FEET: 1
OCCASIONAL FEELINGS OF UNSTEADINESS: 0

## 2024-01-02 NOTE — PROGRESS NOTES
"Subjective   Patient ID: Silvestre Ortiz is a 63 y.o. male who presents for Diabetes.    Checks sugar in morning     Alcohol intake: bottle wine 5/7 days/week   Exercise: not very active     Foot issues:   Achy, numb feeling L worse than right     Hemoglobin A1C:   Lab Results   Component Value Date    HGBA1C 6.8 (A) 08/29/2023   Medications: Metformin 500mg BID ; will be seeing endocrinology; Does admit to having bowel sensitivity to metoformin, but is tolerable at this time.   Foot Exam: mild decreased sensation at tips of toes on left foot; otherwise within normal limits.   Urine Albumin: 195.4m      HTN  Well controlled  Medications: amlodipine 5mg, chlorthalidone 25mg, metoprolol 25mg     HLD:   Rosuvastatin 20mg   Lab Results   Component Value Date    CHOL 173 08/29/2023    CHOL 187 05/25/2023    CHOL 175 12/21/2022     Lab Results   Component Value Date    HDL 39.0 (A) 08/29/2023    HDL 46.8 05/25/2023    HDL 46.7 12/21/2022     No results found for: \"LDLCALC\"  Lab Results   Component Value Date    TRIG 281 (H) 08/29/2023    TRIG 448 (H) 05/25/2023    TRIG 307 (H) 12/21/2022     No components found for: \"CHOLHDL\"     Objective   /80 (BP Location: Left arm, Patient Position: Sitting, BP Cuff Size: Large adult)   Pulse 87   Temp 36 °C (96.8 °F) (Skin)   Wt 108 kg (237 lb 3.2 oz)   SpO2 97%   BMI 36.07 kg/m²     Physical Exam  Constitutional:       General: He is not in acute distress.     Appearance: Normal appearance. He is not ill-appearing, toxic-appearing or diaphoretic.   HENT:      Head: Normocephalic and atraumatic.   Eyes:      Extraocular Movements: Extraocular movements intact.      Pupils: Pupils are equal, round, and reactive to light.   Cardiovascular:      Rate and Rhythm: Normal rate and regular rhythm.      Pulses: Normal pulses.      Heart sounds: Normal heart sounds.   Pulmonary:      Effort: Pulmonary effort is normal.      Breath sounds: Normal breath sounds.   Skin:     " Comments: Red, irritated, flaky skin of right sole of foot.    Neurological:      Mental Status: He is alert.         Assessment/Plan   Problem List Items Addressed This Visit             ICD-10-CM       Cardiac and Vasculature    Hyperlipidemia E78.5     - Repeat Lipid Panel   - Continue Rosuvastatin 20mg          Relevant Medications    rosuvastatin (Crestor) 20 mg tablet    Other Relevant Orders    Comprehensive metabolic panel (Completed)    Lipid panel (Completed)    Uric acid (Completed)    Hypertension I10       Endocrine/Metabolic    Obesity E66.9     - Discussed lifestyle modifications (including diet/exercise)          Type 2 diabetes mellitus (CMS/[facility]) - Primary E11.9     Metformin 500mg BID   - Foot Exam 1/2/24: Mild decreased sensation of tips of left toes, otherwise w/in normal limits   - Urine Albumin 8/29/23 195.4   - Last A1C 8/29/23 6.8%; repeat          Relevant Orders    Hemoglobin A1c (Completed)    Uric acid (Completed)       Musculoskeletal and Injuries    Gout M10.9     Controlled at this time.             Skin    Tinea pedis B35.3     - restart clotrimazole          Relevant Medications    clotrimazole (Lotrimin) 1 % cream     Dora Monroy DO

## 2024-01-03 LAB
EST. AVERAGE GLUCOSE BLD GHB EST-MCNC: 163 MG/DL
HBA1C MFR BLD: 7.3 %
LDLC SERPL DIRECT ASSAY-MCNC: 51 MG/DL (ref 0–129)

## 2024-01-03 NOTE — PROGRESS NOTES
FUV for diabetes. LV with me 2023.    History of Present Illness   63 y.o. male with hx of type 2 diabetes, HTN, HLD, lung cancer s/p VATS wedge resection ().     Dx:   HbA1c: 7.3% (2024), 6.8% (2023), 10.3% (2023), 10.1% (2022)  Current regimen: metformin 500 mg BID  Past medications: none  Complications: none  Comorbidities: HTN, HLD, obesity     SMBG: once a day (random)  160s-230s     Hypoglycemia: none     Diet: three times a day  Breakfast: breakfast sandwich, greek yogurt  Lunch: sandwich/dinner leftovers  Dinner: pasta, protein  Snacks: doritos, michael chips, olives, nuts  Drinks: unsweetened iced tea or water, wine, 3 glasses; 4-5 days per week     Exercise: no    ROS  General: no fever or chills  CV: no chest pain   Respiratory: no shortness of breath  MSK: no lower extremity edema  Neuro: no headache or dizziness  See HPI for Endocrine ROS    Past Medical History:   Diagnosis Date    Malignant neoplasm of lung (CMS/HCC) 2023    Personal history of nicotine dependence     Former smoker    Personal history of other specified conditions     History of abdominal pain    Positive colorectal cancer screening using Cologuard test 2023    Status post appendectomy 2023       Past Surgical History:   Procedure Laterality Date    OTHER SURGICAL HISTORY  2019    Appendectomy    OTHER SURGICAL HISTORY  2017    Wedge Resection Of Lung       Social History     Socioeconomic History    Marital status:      Spouse name: Not on file    Number of children: Not on file    Years of education: Not on file    Highest education level: Not on file   Occupational History    Not on file   Tobacco Use    Smoking status: Former     Packs/day: 1.50     Years: 18.00     Additional pack years: 0.00     Total pack years: 27.00     Types: Cigarettes     Quit date:      Years since quittin.0     Passive exposure: Past    Smokeless tobacco: Never   Substance and Sexual  Activity    Alcohol use: Yes     Alcohol/week: 12.0 standard drinks of alcohol     Types: 12 Glasses of wine per week    Drug use: Not Currently    Sexual activity: Not on file   Other Topics Concern    Not on file   Social History Narrative    Not on file     Social Determinants of Health     Financial Resource Strain: Not on file   Food Insecurity: Not on file   Transportation Needs: Not on file   Physical Activity: Not on file   Stress: Not on file   Social Connections: Not on file   Intimate Partner Violence: Not on file   Housing Stability: Not on file       Physical Exam   vitals were not taken for this visit.   General: not in acute distress  HEENT: LILIAN ROWAN  Thyroid: no goiter  Neuro: alert and oriented x 3    Current Outpatient Medications   Medication Sig Dispense Refill    amLODIPine (Norvasc) 5 mg tablet TAKE ONE TABLET BY MOUTH EVERY DAY 30 tablet 2    chlorthalidone (Hygroton) 25 mg tablet TAKE ONE TABLET BY MOUTH EVERY DAY 30 tablet 11    clotrimazole (Lotrimin) 1 % cream Apply topically 2 times a day. apply to affected area 30 g 0    esomeprazole (NexIUM) 40 mg DR capsule TAKE ONE CAPSULE BY MOUTH EVERY DAY 90 capsule 0    metFORMIN (Glucophage) 500 mg tablet Take 1 tablet (500 mg) by mouth 2 times a day with meals. 60 tablet 11    metoprolol succinate XL (Toprol-XL) 25 mg 24 hr tablet TAKE ONE TABLET BY MOUTH EVERY DAY 90 tablet 1    potassium chloride ER (Micro-K) 10 mEq ER capsule TAKE ONE CAPSULE BY MOUTH EVERY DAY 90 capsule 3    rosuvastatin (Crestor) 20 mg tablet Take 1 tablet (20 mg) by mouth once daily. 90 tablet 1     No current facility-administered medications for this visit.       Assessment and Plan  63 y.o. male with hx of type 2 diabetes, HTN, HLD, lung cancer s/p VATS wedge resection (2009), here for management of diabetes.     1. Type 2 diabetes mellitus  HbA1c: 7.3% (1/2/2024), 6.8% (08/2023), 10.3% (05/25/2023), 10.1% (12/2022)  Current regimen: metformin 500 mg BID  Eye exam:  no DR (07/2023)  Urine microalbumin: 195 micrograms/mg (09/2023)  Podiatry: LV 01/2023  Lipids: HDL 58, LDL 51,  (01/2024) on rosuvastatin 20 mg     A1c: 7.3% last week.  Admits to drinking wine again (was not drinking any before ).  Has been drinking 3-4 glasses, 4-5 days per week.  Not exercising.    Discussed starting GLP-1 RA. He is not opposed to starting, but a little hesitant to do so yet. He will make a strong effort to reduce alcohol and start walking on his treadmill. Discussed that if these efforts are not enough to improve A1c by next visit, I would more strongly recommend starting the GLP-1 RA.  He is agreeable to this. Recommended reducing wine to 1 glass/day and power-walking on treadmill for 30 mins/day, 4-5 days per week.    Also discussed elevated triglycerides. Reducing alcohol will help with this.   Will recheck lipid panel in 3 months. If TG remains elevated, will start Vascepa.    He was shown how to use GLP-1RA pens in office today (Trulicity/Mounjaro and Ozempic). He will ask his insurance which ones are covered before next visit in case we do start.    PLAN:  -continue metformin 500 mg BID (highest tolerable dose)  -check blood sugars once a day (alternate fasting and bedtime)  -continue rosuvastatin 20 mg QDAY  -start exercising 30 mins per day, 4-5 days per week  -reduce wine to 1 glass/day  -check HbA1c and lipids before next visit     Follow-up in 3 months (labs prior)

## 2024-01-03 NOTE — ASSESSMENT & PLAN NOTE
Metformin 500mg BID   - Foot Exam 1/2/24: Mild decreased sensation of tips of left toes, otherwise w/in normal limits   - Urine Albumin 8/29/23 195.4   - Last A1C 8/29/23 6.8%; repeat

## 2024-01-04 ENCOUNTER — APPOINTMENT (OUTPATIENT)
Dept: PRIMARY CARE | Facility: CLINIC | Age: 64
End: 2024-01-04
Payer: COMMERCIAL

## 2024-01-05 ENCOUNTER — TELEPHONE (OUTPATIENT)
Dept: PRIMARY CARE | Facility: CLINIC | Age: 64
End: 2024-01-05
Payer: COMMERCIAL

## 2024-01-05 DIAGNOSIS — E11.9 TYPE 2 DIABETES MELLITUS WITHOUT COMPLICATION, WITHOUT LONG-TERM CURRENT USE OF INSULIN (MULTI): Primary | ICD-10-CM

## 2024-01-05 RX ORDER — AMLODIPINE BESYLATE 5 MG/1
5 TABLET ORAL DAILY
Qty: 30 TABLET | Refills: 0 | Status: SHIPPED | OUTPATIENT
Start: 2024-01-05 | End: 2024-02-02

## 2024-01-05 NOTE — TELEPHONE ENCOUNTER
PT scheduled 3 month follow up 4/3 - asking about lab order for this visit which was mentioned in your message to him.  I did not see labs in system.  He is also asking if he needs to fast for these labs.

## 2024-01-08 ENCOUNTER — OFFICE VISIT (OUTPATIENT)
Dept: ENDOCRINOLOGY | Facility: CLINIC | Age: 64
End: 2024-01-08
Payer: COMMERCIAL

## 2024-01-08 VITALS
WEIGHT: 239 LBS | HEART RATE: 112 BPM | HEIGHT: 68 IN | DIASTOLIC BLOOD PRESSURE: 91 MMHG | SYSTOLIC BLOOD PRESSURE: 159 MMHG | BODY MASS INDEX: 36.22 KG/M2

## 2024-01-08 DIAGNOSIS — E78.2 MIXED HYPERLIPIDEMIA: Primary | ICD-10-CM

## 2024-01-08 PROCEDURE — 3080F DIAST BP >= 90 MM HG: CPT | Performed by: STUDENT IN AN ORGANIZED HEALTH CARE EDUCATION/TRAINING PROGRAM

## 2024-01-08 PROCEDURE — 3048F LDL-C <100 MG/DL: CPT | Performed by: STUDENT IN AN ORGANIZED HEALTH CARE EDUCATION/TRAINING PROGRAM

## 2024-01-08 PROCEDURE — 3077F SYST BP >= 140 MM HG: CPT | Performed by: STUDENT IN AN ORGANIZED HEALTH CARE EDUCATION/TRAINING PROGRAM

## 2024-01-08 PROCEDURE — 3008F BODY MASS INDEX DOCD: CPT | Performed by: STUDENT IN AN ORGANIZED HEALTH CARE EDUCATION/TRAINING PROGRAM

## 2024-01-08 PROCEDURE — 2028F FOOT EXAM PERFORMED: CPT | Performed by: STUDENT IN AN ORGANIZED HEALTH CARE EDUCATION/TRAINING PROGRAM

## 2024-01-08 PROCEDURE — 1036F TOBACCO NON-USER: CPT | Performed by: STUDENT IN AN ORGANIZED HEALTH CARE EDUCATION/TRAINING PROGRAM

## 2024-01-08 PROCEDURE — 3051F HG A1C>EQUAL 7.0%<8.0%: CPT | Performed by: STUDENT IN AN ORGANIZED HEALTH CARE EDUCATION/TRAINING PROGRAM

## 2024-01-08 PROCEDURE — 99215 OFFICE O/P EST HI 40 MIN: CPT | Performed by: STUDENT IN AN ORGANIZED HEALTH CARE EDUCATION/TRAINING PROGRAM

## 2024-01-08 NOTE — PATIENT INSTRUCTIONS
Continue metformin as you are  2.    Continue rosuvastatin as you are  3.    Please have blood and urine tests done a few days before your next visit with me  4.    Reduce alcohol by at least 50%  5.    Start walking/power-walking, 30 minutes per day, 4-5 days per week      Please ask your insurance about the medications below:  Trulicity  Ozempic  Mounjaro

## 2024-01-11 ENCOUNTER — APPOINTMENT (OUTPATIENT)
Dept: PRIMARY CARE | Facility: CLINIC | Age: 64
End: 2024-01-11
Payer: COMMERCIAL

## 2024-01-22 ENCOUNTER — APPOINTMENT (OUTPATIENT)
Dept: OTOLARYNGOLOGY | Facility: CLINIC | Age: 64
End: 2024-01-22
Payer: COMMERCIAL

## 2024-01-29 DIAGNOSIS — K21.9 GASTROESOPHAGEAL REFLUX DISEASE, UNSPECIFIED WHETHER ESOPHAGITIS PRESENT: ICD-10-CM

## 2024-01-29 RX ORDER — ESOMEPRAZOLE MAGNESIUM 40 MG/1
40 CAPSULE, DELAYED RELEASE ORAL DAILY
Qty: 90 CAPSULE | Refills: 0 | Status: SHIPPED | OUTPATIENT
Start: 2024-01-29 | End: 2024-03-05

## 2024-01-31 DIAGNOSIS — I10 PRIMARY HYPERTENSION: ICD-10-CM

## 2024-01-31 RX ORDER — METOPROLOL SUCCINATE 25 MG/1
25 TABLET, EXTENDED RELEASE ORAL DAILY
Qty: 90 TABLET | Refills: 0 | Status: SHIPPED | OUTPATIENT
Start: 2024-01-31 | End: 2024-05-03

## 2024-02-02 DIAGNOSIS — I10 PRIMARY HYPERTENSION: ICD-10-CM

## 2024-02-02 RX ORDER — AMLODIPINE BESYLATE 5 MG/1
5 TABLET ORAL DAILY
Qty: 30 TABLET | Refills: 0 | Status: SHIPPED | OUTPATIENT
Start: 2024-02-02 | End: 2024-03-05

## 2024-03-05 DIAGNOSIS — K21.9 GASTROESOPHAGEAL REFLUX DISEASE, UNSPECIFIED WHETHER ESOPHAGITIS PRESENT: ICD-10-CM

## 2024-03-05 DIAGNOSIS — I10 PRIMARY HYPERTENSION: ICD-10-CM

## 2024-03-05 RX ORDER — AMLODIPINE BESYLATE 5 MG/1
5 TABLET ORAL DAILY
Qty: 30 TABLET | Refills: 0 | Status: SHIPPED | OUTPATIENT
Start: 2024-03-05

## 2024-03-05 RX ORDER — ESOMEPRAZOLE MAGNESIUM 40 MG/1
40 CAPSULE, DELAYED RELEASE ORAL DAILY
Qty: 90 CAPSULE | Refills: 0 | Status: SHIPPED | OUTPATIENT
Start: 2024-03-05

## 2024-03-14 ENCOUNTER — APPOINTMENT (OUTPATIENT)
Dept: OTOLARYNGOLOGY | Facility: CLINIC | Age: 64
End: 2024-03-14
Payer: COMMERCIAL

## 2024-04-02 DIAGNOSIS — I10 PRIMARY HYPERTENSION: ICD-10-CM

## 2024-04-02 RX ORDER — CHLORTHALIDONE 25 MG/1
TABLET ORAL
Qty: 30 TABLET | Refills: 0 | Status: SHIPPED | OUTPATIENT
Start: 2024-04-02 | End: 2024-05-03

## 2024-04-02 RX ORDER — POTASSIUM CHLORIDE 750 MG/1
CAPSULE, EXTENDED RELEASE ORAL
Qty: 90 CAPSULE | Refills: 0 | Status: SHIPPED | OUTPATIENT
Start: 2024-04-02

## 2024-04-02 ASSESSMENT — ENCOUNTER SYMPTOMS
NECK PAIN: 0
SHORTNESS OF BREATH: 0
PND: 0
PALPITATIONS: 0
ORTHOPNEA: 0
BLURRED VISION: 0
HYPERTENSION: 1
SWEATS: 0
HEADACHES: 0

## 2024-04-03 ENCOUNTER — LAB (OUTPATIENT)
Dept: LAB | Facility: LAB | Age: 64
End: 2024-04-03
Payer: COMMERCIAL

## 2024-04-03 ENCOUNTER — OFFICE VISIT (OUTPATIENT)
Dept: PRIMARY CARE | Facility: CLINIC | Age: 64
End: 2024-04-03
Payer: COMMERCIAL

## 2024-04-03 VITALS
WEIGHT: 230.38 LBS | OXYGEN SATURATION: 97 % | HEART RATE: 85 BPM | SYSTOLIC BLOOD PRESSURE: 130 MMHG | DIASTOLIC BLOOD PRESSURE: 86 MMHG | BODY MASS INDEX: 35.03 KG/M2 | TEMPERATURE: 97.3 F

## 2024-04-03 DIAGNOSIS — E78.2 MIXED HYPERLIPIDEMIA: ICD-10-CM

## 2024-04-03 DIAGNOSIS — E11.9 TYPE 2 DIABETES MELLITUS WITHOUT COMPLICATION, WITHOUT LONG-TERM CURRENT USE OF INSULIN (MULTI): ICD-10-CM

## 2024-04-03 DIAGNOSIS — E11.9 TYPE 2 DIABETES MELLITUS WITHOUT COMPLICATION, WITHOUT LONG-TERM CURRENT USE OF INSULIN (MULTI): Primary | ICD-10-CM

## 2024-04-03 DIAGNOSIS — I10 PRIMARY HYPERTENSION: ICD-10-CM

## 2024-04-03 LAB
CHOLEST SERPL-MCNC: 124 MG/DL (ref 0–199)
CHOLESTEROL/HDL RATIO: 2.5
HDLC SERPL-MCNC: 50.6 MG/DL
LDLC SERPL CALC-MCNC: 10 MG/DL
NON HDL CHOLESTEROL: 73 MG/DL (ref 0–149)
TRIGL SERPL-MCNC: 318 MG/DL (ref 0–149)
VLDL: 64 MG/DL (ref 0–40)

## 2024-04-03 PROCEDURE — 3079F DIAST BP 80-89 MM HG: CPT | Performed by: FAMILY MEDICINE

## 2024-04-03 PROCEDURE — 3075F SYST BP GE 130 - 139MM HG: CPT | Performed by: FAMILY MEDICINE

## 2024-04-03 PROCEDURE — 80061 LIPID PANEL: CPT

## 2024-04-03 PROCEDURE — 3008F BODY MASS INDEX DOCD: CPT | Performed by: FAMILY MEDICINE

## 2024-04-03 PROCEDURE — 36415 COLL VENOUS BLD VENIPUNCTURE: CPT

## 2024-04-03 PROCEDURE — 99213 OFFICE O/P EST LOW 20 MIN: CPT | Performed by: FAMILY MEDICINE

## 2024-04-03 PROCEDURE — 3051F HG A1C>EQUAL 7.0%<8.0%: CPT | Performed by: FAMILY MEDICINE

## 2024-04-03 PROCEDURE — 2028F FOOT EXAM PERFORMED: CPT | Performed by: FAMILY MEDICINE

## 2024-04-03 PROCEDURE — 3048F LDL-C <100 MG/DL: CPT | Performed by: FAMILY MEDICINE

## 2024-04-03 PROCEDURE — 83036 HEMOGLOBIN GLYCOSYLATED A1C: CPT

## 2024-04-03 ASSESSMENT — ENCOUNTER SYMPTOMS
DEPRESSION: 0
OCCASIONAL FEELINGS OF UNSTEADINESS: 0
LOSS OF SENSATION IN FEET: 0

## 2024-04-03 NOTE — PROGRESS NOTES
Subjective   Patient ID: Silvestre Ortiz is a 63 y.o. male who presents for Diabetes and Hypertension.    Answers submitted by the patient for this visit:  High Blood Pressure Questionnaire (Submitted on 4/2/2024)  Chief Complaint: Hypertension  Chronicity: recurrent  Onset: more than 1 year ago  Progression since onset: unchanged  Condition status: controlled  anxiety: No  blurred vision: No  chest pain: No  headaches: No  malaise/fatigue: No  neck pain: No  orthopnea: No  palpitations: No  peripheral edema: No  PND: No  shortness of breath: No  sweats: No  Agents associated with hypertension: no associated agents  CAD risks: diabetes mellitus, obesity, sedentary lifestyle  Compliance problems: diet, exercise    Cutting back on alcohol; not drinking during week     Objective   /86 (BP Location: Left arm, Patient Position: Sitting, BP Cuff Size: Large adult)   Pulse 85   Temp 36.3 °C (97.3 °F) (Skin)   Wt 104 kg (230 lb 6.1 oz)   SpO2 97%   BMI 35.03 kg/m²     Physical Exam  Constitutional:       General: He is not in acute distress.     Appearance: Normal appearance. He is not ill-appearing, toxic-appearing or diaphoretic.   HENT:      Head: Normocephalic and atraumatic.   Eyes:      Extraocular Movements: Extraocular movements intact.      Pupils: Pupils are equal, round, and reactive to light.   Cardiovascular:      Rate and Rhythm: Normal rate and regular rhythm.      Pulses: Normal pulses.      Heart sounds: Normal heart sounds.   Pulmonary:      Effort: Pulmonary effort is normal.      Breath sounds: Normal breath sounds.   Neurological:      Mental Status: He is alert.         Assessment/Plan   Diagnoses and all orders for this visit:  Type 2 diabetes mellitus without complication, without long-term current use of insulin (CMS/[facility])  Primary hypertension  Mixed hyperlipidemia    Silvestre is a 61-year-old male presenting for follow-up on type 2 diabetes hypertension and hyperlipidemia.  He  does follow with endocrinology for diabetes.  He will be redoing his A1c today after this visit.  He and his endocrinologist have been talking to Mounjaro/Ozempic for therapy but the patient is not sure if he is ready to do that kind of thing.  He continues on metformin however does note intermittent diarrhea.  In regards to his blood pressure his blood pressure is well-controlled at 130/86 today.  He continues on rosuvastatin for his hyperlipidemia.  Follow-up in 6 months.    Dora Monroy DO     I spent a total of 23 minutes on the date of the service which included preparing to see the patient, face-to-face patient care, completing clinical documentation, performing a medically appropriate examination, counseling and educating the patient/family/caregiver and ordering medications, tests, or procedures.

## 2024-04-04 LAB
EST. AVERAGE GLUCOSE BLD GHB EST-MCNC: 174 MG/DL
HBA1C MFR BLD: 7.7 %

## 2024-04-04 NOTE — PROGRESS NOTES
FUV for diabetes. LV with me 2024.    History of Present Illness   63 y.o. male with hx of type 2 diabetes, HTN, HLD, lung cancer s/p VATS wedge resection ().     Dx:   HbA1c: 7.7% (2024), 7.3% (2024), 6.8% (2023), 10.3% (2023), 10.1% (2022)  Current regimen: metformin 500 mg BID  Past medications: none  Complications: none  Comorbidities: HTN, HLD, obesity     SMBG: once a day (random)  160s-230s     Hypoglycemia: none     Diet: three times a day  Breakfast: breakfast sandwich, greek yogurt  Lunch: sandwich/dinner leftovers  Dinner: pasta, protein  Snacks: doritos, michael chips, olives, nuts  Drinks: unsweetened iced tea or water, wine, 3 glasses; 4-5 days per week     Exercise: no    ROS  General: no fever or chills  CV: no chest pain   Respiratory: no shortness of breath  MSK: no lower extremity edema  Neuro: no headache or dizziness  See HPI for Endocrine ROS    Past Medical History:   Diagnosis Date    Malignant neoplasm of lung (CMS/HCC) 2023    Personal history of nicotine dependence     Former smoker    Personal history of other specified conditions     History of abdominal pain    Positive colorectal cancer screening using Cologuard test 2023    Status post appendectomy 2023       Past Surgical History:   Procedure Laterality Date    OTHER SURGICAL HISTORY  2019    Appendectomy    OTHER SURGICAL HISTORY  2017    Wedge Resection Of Lung       Social History     Socioeconomic History    Marital status:      Spouse name: Not on file    Number of children: Not on file    Years of education: Not on file    Highest education level: Not on file   Occupational History    Not on file   Tobacco Use    Smoking status: Former     Current packs/day: 0.00     Average packs/day: 1.5 packs/day for 18.0 years (27.0 ttl pk-yrs)     Types: Cigarettes     Start date:      Quit date:      Years since quittin.2     Passive exposure: Past    Smokeless  "tobacco: Never   Substance and Sexual Activity    Alcohol use: Yes     Alcohol/week: 12.0 standard drinks of alcohol     Types: 12 Glasses of wine per week    Drug use: Not Currently    Sexual activity: Not on file   Other Topics Concern    Not on file   Social History Narrative    Not on file     Social Determinants of Health     Financial Resource Strain: Not on file   Food Insecurity: Not on file   Transportation Needs: Not on file   Physical Activity: Not on file   Stress: Not on file   Social Connections: Not on file   Intimate Partner Violence: Not on file   Housing Stability: Not on file       Physical Exam   height is 1.727 m (5' 8\") and weight is 108 kg (238 lb). His blood pressure is 136/87 and his pulse is 95.   General: not in acute distress  HEENT: LILIAN ROWAN  Thyroid: no goiter  Neuro: alert and oriented x 3    Current Outpatient Medications   Medication Sig Dispense Refill    amLODIPine (Norvasc) 5 mg tablet TAKE ONE TABLET BY MOUTH EVERY DAY 30 tablet 0    chlorthalidone (Hygroton) 25 mg tablet TAKE ONE TABLET BY MOUTH EVERY DAY 30 tablet 0    clotrimazole (Lotrimin) 1 % cream Apply topically 2 times a day. apply to affected area 30 g 0    esomeprazole (NexIUM) 40 mg DR capsule TAKE ONE CAPSULE BY MOUTH EVERY DAY 90 capsule 0    metFORMIN (Glucophage) 500 mg tablet Take 1 tablet (500 mg) by mouth 2 times a day with meals. 60 tablet 11    metoprolol succinate XL (Toprol-XL) 25 mg 24 hr tablet TAKE ONE TABLET BY MOUTH EVERY DAY 90 tablet 0    potassium chloride ER (Micro-K) 10 mEq ER capsule TAKE ONE CAPSULE BY MOUTH EVERY DAY 90 capsule 0    rosuvastatin (Crestor) 20 mg tablet Take 1 tablet (20 mg) by mouth once daily. 90 tablet 1     No current facility-administered medications for this visit.       Assessment and Plan  63 y.o. male with hx of type 2 diabetes, HTN, HLD, lung cancer s/p VATS wedge resection (2009), here for management of diabetes.     1. Type 2 diabetes mellitus  HbA1c: 7.7% (7.3% " (1/2/2024), 6.8% (08/2023), 10.3% (05/25/2023), 10.1% (12/2022)  Current regimen: metformin 500 mg BID  Eye exam: no DR (07/2023)  Urine microalbumin: 195 micrograms/mg (09/2023)  Podiatry: LV 01/2023  Lipids: HDL 51, LDL 10,  (04/2024) on rosuvastatin 20 mg     A1c: 7.7%.  Has not been very successful in making dietary changes or exercises.  Discussed GLP-1 RA again and he is willing to start.  Hx of pancreatitis: no  Personal or FH of medullary thyroid cancer: no    Also discussed elevated triglycerides. Reducing alcohol will help with this.   Improvement in glycemic control will also be helpful.  If TG remains elevated despite improvement in glycemic control, will add Vascepa.    PLAN:  -start Ozempic 0.25 mg/week, after 4 weeks increase to 0.5 mg/week  -continue metformin 500 mg BID (highest tolerable dose)  -check blood sugars once a day (alternate fasting and bedtime)  -continue rosuvastatin 20 mg QDAY  -exercising 30 mins per day, 4-5 days per week  -reduce wine to 1 glass/day    Follow-up in 3-4 months

## 2024-04-10 ENCOUNTER — TELEPHONE (OUTPATIENT)
Dept: ENDOCRINOLOGY | Facility: CLINIC | Age: 64
End: 2024-04-10

## 2024-04-10 ENCOUNTER — OFFICE VISIT (OUTPATIENT)
Dept: ENDOCRINOLOGY | Facility: CLINIC | Age: 64
End: 2024-04-10
Payer: COMMERCIAL

## 2024-04-10 VITALS
HEART RATE: 95 BPM | BODY MASS INDEX: 36.07 KG/M2 | DIASTOLIC BLOOD PRESSURE: 87 MMHG | SYSTOLIC BLOOD PRESSURE: 136 MMHG | WEIGHT: 238 LBS | HEIGHT: 68 IN

## 2024-04-10 DIAGNOSIS — Z79.4 TYPE 2 DIABETES MELLITUS WITH HYPERGLYCEMIA, WITH LONG-TERM CURRENT USE OF INSULIN (MULTI): Primary | ICD-10-CM

## 2024-04-10 DIAGNOSIS — E11.65 TYPE 2 DIABETES MELLITUS WITH HYPERGLYCEMIA, WITH LONG-TERM CURRENT USE OF INSULIN (MULTI): Primary | ICD-10-CM

## 2024-04-10 PROCEDURE — 2028F FOOT EXAM PERFORMED: CPT | Performed by: STUDENT IN AN ORGANIZED HEALTH CARE EDUCATION/TRAINING PROGRAM

## 2024-04-10 PROCEDURE — 3048F LDL-C <100 MG/DL: CPT | Performed by: STUDENT IN AN ORGANIZED HEALTH CARE EDUCATION/TRAINING PROGRAM

## 2024-04-10 PROCEDURE — 3008F BODY MASS INDEX DOCD: CPT | Performed by: STUDENT IN AN ORGANIZED HEALTH CARE EDUCATION/TRAINING PROGRAM

## 2024-04-10 PROCEDURE — 3051F HG A1C>EQUAL 7.0%<8.0%: CPT | Performed by: STUDENT IN AN ORGANIZED HEALTH CARE EDUCATION/TRAINING PROGRAM

## 2024-04-10 PROCEDURE — 99214 OFFICE O/P EST MOD 30 MIN: CPT | Performed by: STUDENT IN AN ORGANIZED HEALTH CARE EDUCATION/TRAINING PROGRAM

## 2024-04-10 PROCEDURE — 3079F DIAST BP 80-89 MM HG: CPT | Performed by: STUDENT IN AN ORGANIZED HEALTH CARE EDUCATION/TRAINING PROGRAM

## 2024-04-10 PROCEDURE — 3075F SYST BP GE 130 - 139MM HG: CPT | Performed by: STUDENT IN AN ORGANIZED HEALTH CARE EDUCATION/TRAINING PROGRAM

## 2024-04-10 PROCEDURE — 1036F TOBACCO NON-USER: CPT | Performed by: STUDENT IN AN ORGANIZED HEALTH CARE EDUCATION/TRAINING PROGRAM

## 2024-04-10 RX ORDER — SEMAGLUTIDE 0.68 MG/ML
0.5 INJECTION, SOLUTION SUBCUTANEOUS
Qty: 3 ML | Refills: 5 | Status: SHIPPED | OUTPATIENT
Start: 2024-04-10

## 2024-04-10 ASSESSMENT — PAIN SCALES - GENERAL: PAINLEVEL: 0-NO PAIN

## 2024-04-29 ENCOUNTER — PATIENT MESSAGE (OUTPATIENT)
Dept: PRIMARY CARE | Facility: CLINIC | Age: 64
End: 2024-04-29
Payer: COMMERCIAL

## 2024-04-29 DIAGNOSIS — B35.3 TINEA PEDIS OF BOTH FEET: Primary | ICD-10-CM

## 2024-04-29 RX ORDER — TERBINAFINE HYDROCHLORIDE 250 MG/1
250 TABLET ORAL DAILY
Qty: 42 TABLET | Refills: 0 | Status: SHIPPED | OUTPATIENT
Start: 2024-04-29 | End: 2024-06-10

## 2024-04-29 NOTE — TELEPHONE ENCOUNTER
Pa in system still shows pending. I have archived this request and have re-initiated with insurance via [facility]  KEY [id number]

## 2024-05-03 DIAGNOSIS — I10 PRIMARY HYPERTENSION: ICD-10-CM

## 2024-05-03 RX ORDER — METOPROLOL SUCCINATE 25 MG/1
25 TABLET, EXTENDED RELEASE ORAL DAILY
Qty: 90 TABLET | Refills: 0 | Status: SHIPPED | OUTPATIENT
Start: 2024-05-03

## 2024-05-03 RX ORDER — CHLORTHALIDONE 25 MG/1
TABLET ORAL
Qty: 30 TABLET | Refills: 0 | Status: SHIPPED | OUTPATIENT
Start: 2024-05-03 | End: 2024-06-04

## 2024-06-01 DIAGNOSIS — I10 PRIMARY HYPERTENSION: ICD-10-CM

## 2024-06-04 DIAGNOSIS — E11.9 TYPE 2 DIABETES MELLITUS WITHOUT COMPLICATION, WITHOUT LONG-TERM CURRENT USE OF INSULIN (MULTI): ICD-10-CM

## 2024-06-04 RX ORDER — CHLORTHALIDONE 25 MG/1
TABLET ORAL
Qty: 30 TABLET | Refills: 0 | Status: SHIPPED | OUTPATIENT
Start: 2024-06-04

## 2024-06-04 RX ORDER — METFORMIN HYDROCHLORIDE 500 MG/1
500 TABLET ORAL
Qty: 60 TABLET | Refills: 0 | Status: SHIPPED | OUTPATIENT
Start: 2024-06-04

## 2024-06-14 DIAGNOSIS — I10 PRIMARY HYPERTENSION: ICD-10-CM

## 2024-06-17 RX ORDER — AMLODIPINE BESYLATE 5 MG/1
5 TABLET ORAL DAILY
Qty: 30 TABLET | Refills: 0 | Status: SHIPPED | OUTPATIENT
Start: 2024-06-17

## 2024-07-02 ENCOUNTER — APPOINTMENT (OUTPATIENT)
Dept: PRIMARY CARE | Facility: CLINIC | Age: 64
End: 2024-07-02
Payer: COMMERCIAL

## 2024-07-03 ENCOUNTER — APPOINTMENT (OUTPATIENT)
Dept: SURGERY | Facility: HOSPITAL | Age: 64
End: 2024-07-03
Payer: COMMERCIAL

## 2024-07-03 ENCOUNTER — APPOINTMENT (OUTPATIENT)
Dept: RADIOLOGY | Facility: HOSPITAL | Age: 64
End: 2024-07-03
Payer: COMMERCIAL

## 2024-07-05 ENCOUNTER — APPOINTMENT (OUTPATIENT)
Dept: PRIMARY CARE | Facility: CLINIC | Age: 64
End: 2024-07-05
Payer: COMMERCIAL

## 2024-07-08 DIAGNOSIS — E11.9 TYPE 2 DIABETES MELLITUS WITHOUT COMPLICATION, WITHOUT LONG-TERM CURRENT USE OF INSULIN (MULTI): ICD-10-CM

## 2024-07-08 DIAGNOSIS — I10 PRIMARY HYPERTENSION: ICD-10-CM

## 2024-07-09 RX ORDER — METFORMIN HYDROCHLORIDE 500 MG/1
500 TABLET ORAL
Qty: 60 TABLET | Refills: 0 | Status: SHIPPED | OUTPATIENT
Start: 2024-07-09

## 2024-07-09 RX ORDER — POTASSIUM CHLORIDE 750 MG/1
CAPSULE, EXTENDED RELEASE ORAL
Qty: 90 CAPSULE | Refills: 0 | Status: SHIPPED | OUTPATIENT
Start: 2024-07-09

## 2024-07-09 RX ORDER — CHLORTHALIDONE 25 MG/1
TABLET ORAL
Qty: 30 TABLET | Refills: 0 | Status: SHIPPED | OUTPATIENT
Start: 2024-07-09

## 2024-07-13 DIAGNOSIS — I10 PRIMARY HYPERTENSION: ICD-10-CM

## 2024-07-15 RX ORDER — AMLODIPINE BESYLATE 5 MG/1
5 TABLET ORAL DAILY
Qty: 30 TABLET | Refills: 0 | Status: SHIPPED | OUTPATIENT
Start: 2024-07-15

## 2024-07-23 DIAGNOSIS — C34.12 MALIGNANT NEOPLASM OF UPPER LOBE OF LEFT LUNG (MULTI): Primary | ICD-10-CM

## 2024-08-01 DIAGNOSIS — E11.9 TYPE 2 DIABETES MELLITUS WITHOUT COMPLICATION, WITHOUT LONG-TERM CURRENT USE OF INSULIN (MULTI): ICD-10-CM

## 2024-08-01 DIAGNOSIS — K21.9 GASTROESOPHAGEAL REFLUX DISEASE, UNSPECIFIED WHETHER ESOPHAGITIS PRESENT: ICD-10-CM

## 2024-08-01 DIAGNOSIS — E78.2 MIXED HYPERLIPIDEMIA: ICD-10-CM

## 2024-08-01 DIAGNOSIS — I10 PRIMARY HYPERTENSION: ICD-10-CM

## 2024-08-02 RX ORDER — ROSUVASTATIN CALCIUM 20 MG/1
20 TABLET, COATED ORAL DAILY
Qty: 90 TABLET | Refills: 0 | Status: SHIPPED | OUTPATIENT
Start: 2024-08-02

## 2024-08-02 RX ORDER — METFORMIN HYDROCHLORIDE 500 MG/1
500 TABLET ORAL
Qty: 60 TABLET | Refills: 0 | Status: SHIPPED | OUTPATIENT
Start: 2024-08-02

## 2024-08-02 RX ORDER — CHLORTHALIDONE 25 MG/1
TABLET ORAL
Qty: 30 TABLET | Refills: 0 | Status: SHIPPED | OUTPATIENT
Start: 2024-08-02

## 2024-08-02 RX ORDER — AMLODIPINE BESYLATE 5 MG/1
5 TABLET ORAL DAILY
Qty: 30 TABLET | Refills: 0 | Status: SHIPPED | OUTPATIENT
Start: 2024-08-02

## 2024-08-02 RX ORDER — ESOMEPRAZOLE MAGNESIUM 40 MG/1
40 CAPSULE, DELAYED RELEASE ORAL DAILY
Qty: 90 CAPSULE | Refills: 0 | Status: SHIPPED | OUTPATIENT
Start: 2024-08-02

## 2024-08-02 RX ORDER — METOPROLOL SUCCINATE 25 MG/1
25 TABLET, EXTENDED RELEASE ORAL DAILY
Qty: 90 TABLET | Refills: 0 | Status: SHIPPED | OUTPATIENT
Start: 2024-08-02

## 2024-08-02 NOTE — PROGRESS NOTES
FUV for diabetes. LV with me 04/2024.    History of Present Illness   63 y.o. male with hx of type 2 diabetes, HTN, HLD, lung cancer s/p VATS wedge resection (2009).     Dx: 2021  HbA1c: 6.7% (8/7/2024), 7.7% (03/2024), 7.3% (1/2/2024), 6.8% (08/2023), 10.3% (05/25/2023), 10.1% (12/2022)  Current regimen: metformin 500 mg BID, Ozempic 0.5 mg/week  Past medications: none  Complications: none  Comorbidities: HTN, HLD, obesity     SMBG: once a day (random)  Mostly in the mid to high 100s     Hypoglycemia: none     Diet: three times a day  Breakfast: breakfast sandwich, greek yogurt  Lunch: sandwich/dinner leftovers  Dinner: pasta, protein  Snacks: doritos, michael chips, olives, nuts  Drinks: unsweetened iced tea or water, wine, 3 glasses; 4-5 days per week     Exercise: no    ROS  General: no fever or chills  CV: no chest pain   Respiratory: no shortness of breath  MSK: no lower extremity edema  Neuro: no headache or dizziness  See HPI for Endocrine ROS    Past Medical History:   Diagnosis Date    Malignant neoplasm of lung (Multi) 02/04/2023    Personal history of nicotine dependence     Former smoker    Personal history of other specified conditions     History of abdominal pain    Positive colorectal cancer screening using Cologuard test 02/04/2023    Status post appendectomy 02/04/2023       Past Surgical History:   Procedure Laterality Date    OTHER SURGICAL HISTORY  11/27/2019    Appendectomy    OTHER SURGICAL HISTORY  06/09/2017    Wedge Resection Of Lung       Social History     Socioeconomic History    Marital status:      Spouse name: Not on file    Number of children: Not on file    Years of education: Not on file    Highest education level: Not on file   Occupational History    Not on file   Tobacco Use    Smoking status: Former     Current packs/day: 0.00     Average packs/day: 1.5 packs/day for 18.0 years (27.0 ttl pk-yrs)     Types: Cigarettes     Start date: 1978     Quit date: 1996     Years since  "quittin.6     Passive exposure: Past    Smokeless tobacco: Never   Substance and Sexual Activity    Alcohol use: Yes     Alcohol/week: 8.0 standard drinks of alcohol     Types: 8 Glasses of wine per week    Drug use: Not Currently    Sexual activity: Not on file   Other Topics Concern    Not on file   Social History Narrative    Not on file     Social Determinants of Health     Financial Resource Strain: Not on file   Food Insecurity: Not on file   Transportation Needs: Not on file   Physical Activity: Not on file   Stress: Not on file   Social Connections: Not on file   Intimate Partner Violence: Not on file   Housing Stability: Not on file       Physical Exam   height is 1.727 m (5' 8\") and weight is 104 kg (230 lb). His blood pressure is 135/90 and his pulse is 86.   General: not in acute distress  HEENT: LILIAN ROWAN  Thyroid: no goiter  Neuro: alert and oriented x 3    Current Outpatient Medications   Medication Sig Dispense Refill    amLODIPine (Norvasc) 5 mg tablet TAKE ONE TABLET BY MOUTH EVERY DAY 30 tablet 0    chlorthalidone (Hygroton) 25 mg tablet TAKE ONE TABLET BY MOUTH EVERY DAY 30 tablet 0    esomeprazole (NexIUM) 40 mg DR capsule TAKE ONE CAPSULE BY MOUTH EVERY DAY 90 capsule 0    metFORMIN (Glucophage) 500 mg tablet TAKE ONE TABLET BY MOUTH TWICE A DAY WITH MEALS 60 tablet 0    metoprolol succinate XL (Toprol-XL) 25 mg 24 hr tablet TAKE ONE TABLET BY MOUTH ONCE A DAY 90 tablet 0    potassium chloride ER (Micro-K) 10 mEq ER capsule TAKE ONE CAPSULE BY MOUTH EVERY DAY 90 capsule 0    rosuvastatin (Crestor) 20 mg tablet TAKE ONE TABLET BY MOUTH ONCE DAILY 90 tablet 0    [START ON 2024] semaglutide (Ozempic) 1 mg/dose (4 mg/3 mL) pen injector Inject 1 mg under the skin 1 (one) time per week. 3 mL 5     No current facility-administered medications for this visit.       Assessment and Plan  63 y.o. male with hx of type 2 diabetes, HTN, HLD, lung cancer s/p VATS wedge resection (), here for " management of diabetes.     1. Type 2 diabetes mellitus  HbA1c: 6.7% (8/7/2024), 7.7% (03/2024), 7.3% (1/2/2024), 6.8% (08/2023), 10.3% (05/25/2023), 10.1% (12/2022)  Current regimen: metformin 500 mg BID, Ozempic 0.5 mg/week  Eye exam: no DR (06/2024)  Urine microalbumin: 195 micrograms/mg (08/2023)  Podiatry: LV 01/2023  Lipids: HDL 51, LDL 10,  (04/2024) on rosuvastatin 20 mg     A1c: 6.7% today!  Tolerating Ozempic but does have diarrhea the day following the injection, but it is getting better over time. Lost 8 lbs since last visit.  Will try to increase Ozempic to the 1 mg dose.   Advised patient to let me know if he cannot tolerate the 1 mg dose.    Has not yet started exercising. Knows that he should.  Has a treadmill and bowflex at home. Encouraged him again to start walking on the treadmill.  Advised that he can start with 3 days per week, 30 min per session and build up to 4-5 days per week.    PLAN:  -increase Ozempic to 1 mg/week  -continue metformin 500 mg BID (highest tolerable dose)  -check blood sugars once a day (alternate fasting and bedtime)  -continue rosuvastatin 20 mg QDAY  -exercise 30 mins per day, 4-5 days per week  -reduce wine to 1 glass/day  -check A1c, lipids, CMP, urine microalbumin before next visit    Follow-up in 4 months (labs prior)  Uses [facility].

## 2024-08-07 ENCOUNTER — APPOINTMENT (OUTPATIENT)
Dept: ENDOCRINOLOGY | Facility: CLINIC | Age: 64
End: 2024-08-07
Payer: COMMERCIAL

## 2024-08-07 VITALS
BODY MASS INDEX: 34.86 KG/M2 | HEART RATE: 86 BPM | WEIGHT: 230 LBS | DIASTOLIC BLOOD PRESSURE: 90 MMHG | SYSTOLIC BLOOD PRESSURE: 135 MMHG | HEIGHT: 68 IN

## 2024-08-07 DIAGNOSIS — Z79.4 TYPE 2 DIABETES MELLITUS WITH HYPERGLYCEMIA, WITH LONG-TERM CURRENT USE OF INSULIN (MULTI): Primary | ICD-10-CM

## 2024-08-07 DIAGNOSIS — E11.65 TYPE 2 DIABETES MELLITUS WITH HYPERGLYCEMIA, WITH LONG-TERM CURRENT USE OF INSULIN (MULTI): Primary | ICD-10-CM

## 2024-08-07 LAB — POC HEMOGLOBIN A1C: 6.7 % (ref 4.2–6.5)

## 2024-08-07 PROCEDURE — 3051F HG A1C>EQUAL 7.0%<8.0%: CPT | Performed by: STUDENT IN AN ORGANIZED HEALTH CARE EDUCATION/TRAINING PROGRAM

## 2024-08-07 PROCEDURE — 83036 HEMOGLOBIN GLYCOSYLATED A1C: CPT | Performed by: STUDENT IN AN ORGANIZED HEALTH CARE EDUCATION/TRAINING PROGRAM

## 2024-08-07 PROCEDURE — 2028F FOOT EXAM PERFORMED: CPT | Performed by: STUDENT IN AN ORGANIZED HEALTH CARE EDUCATION/TRAINING PROGRAM

## 2024-08-07 PROCEDURE — 1036F TOBACCO NON-USER: CPT | Performed by: STUDENT IN AN ORGANIZED HEALTH CARE EDUCATION/TRAINING PROGRAM

## 2024-08-07 PROCEDURE — 3048F LDL-C <100 MG/DL: CPT | Performed by: STUDENT IN AN ORGANIZED HEALTH CARE EDUCATION/TRAINING PROGRAM

## 2024-08-07 PROCEDURE — 99215 OFFICE O/P EST HI 40 MIN: CPT | Performed by: STUDENT IN AN ORGANIZED HEALTH CARE EDUCATION/TRAINING PROGRAM

## 2024-08-07 PROCEDURE — 3075F SYST BP GE 130 - 139MM HG: CPT | Performed by: STUDENT IN AN ORGANIZED HEALTH CARE EDUCATION/TRAINING PROGRAM

## 2024-08-07 PROCEDURE — 3008F BODY MASS INDEX DOCD: CPT | Performed by: STUDENT IN AN ORGANIZED HEALTH CARE EDUCATION/TRAINING PROGRAM

## 2024-08-07 PROCEDURE — 3080F DIAST BP >= 90 MM HG: CPT | Performed by: STUDENT IN AN ORGANIZED HEALTH CARE EDUCATION/TRAINING PROGRAM

## 2024-08-07 RX ORDER — SEMAGLUTIDE 1.34 MG/ML
1 INJECTION, SOLUTION SUBCUTANEOUS
Qty: 3 ML | Refills: 5 | Status: SHIPPED | OUTPATIENT
Start: 2024-08-11

## 2024-08-07 ASSESSMENT — PAIN SCALES - GENERAL: PAINLEVEL: 0-NO PAIN

## 2024-08-07 NOTE — PATIENT INSTRUCTIONS
Increase Ozempic to 1 mg, once a week.  Please let me know if you are not tolerating the 1 mg dose.  Please have blood and urine tests a few days before your next visit with me.

## 2024-08-14 ENCOUNTER — OFFICE VISIT (OUTPATIENT)
Dept: SURGERY | Facility: HOSPITAL | Age: 64
End: 2024-08-14
Payer: COMMERCIAL

## 2024-08-14 ENCOUNTER — HOSPITAL ENCOUNTER (OUTPATIENT)
Dept: RADIOLOGY | Facility: HOSPITAL | Age: 64
Discharge: HOME | End: 2024-08-14
Payer: COMMERCIAL

## 2024-08-14 VITALS
TEMPERATURE: 97.5 F | OXYGEN SATURATION: 99 % | BODY MASS INDEX: 35.53 KG/M2 | WEIGHT: 233.69 LBS | SYSTOLIC BLOOD PRESSURE: 147 MMHG | HEART RATE: 89 BPM | RESPIRATION RATE: 18 BRPM | DIASTOLIC BLOOD PRESSURE: 91 MMHG

## 2024-08-14 DIAGNOSIS — C34.90 MALIGNANT NEOPLASM OF LUNG, UNSPECIFIED LATERALITY, UNSPECIFIED PART OF LUNG (MULTI): ICD-10-CM

## 2024-08-14 DIAGNOSIS — C34.12 MALIGNANT NEOPLASM OF UPPER LOBE OF LEFT LUNG (MULTI): ICD-10-CM

## 2024-08-14 PROCEDURE — 99214 OFFICE O/P EST MOD 30 MIN: CPT | Performed by: THORACIC SURGERY (CARDIOTHORACIC VASCULAR SURGERY)

## 2024-08-14 PROCEDURE — 3077F SYST BP >= 140 MM HG: CPT | Performed by: THORACIC SURGERY (CARDIOTHORACIC VASCULAR SURGERY)

## 2024-08-14 PROCEDURE — 3048F LDL-C <100 MG/DL: CPT | Performed by: THORACIC SURGERY (CARDIOTHORACIC VASCULAR SURGERY)

## 2024-08-14 PROCEDURE — 71250 CT THORAX DX C-: CPT

## 2024-08-14 PROCEDURE — 3051F HG A1C>EQUAL 7.0%<8.0%: CPT | Performed by: THORACIC SURGERY (CARDIOTHORACIC VASCULAR SURGERY)

## 2024-08-14 PROCEDURE — 3080F DIAST BP >= 90 MM HG: CPT | Performed by: THORACIC SURGERY (CARDIOTHORACIC VASCULAR SURGERY)

## 2024-08-14 PROCEDURE — 2028F FOOT EXAM PERFORMED: CPT | Performed by: THORACIC SURGERY (CARDIOTHORACIC VASCULAR SURGERY)

## 2024-08-14 ASSESSMENT — PAIN SCALES - GENERAL: PAINLEVEL: 0-NO PAIN

## 2024-08-14 NOTE — PROGRESS NOTES
Mr. Ortiz underwent a left VATS wedge resection on 6/11/09 for a 7 mm adenocarcinoma of the lung. Since his last visit there been no changes in his past medical history, family history, social history, or review systems.     I viewed the current CT scan and compared it to the prior.  There are no new lung nodules or adenopathy.  There is no evidence of recurrence.  Silvestre is doing well and is without evidence of recurrence.  I will see him back in 1 year with a CAT scan

## 2024-08-17 DIAGNOSIS — I10 PRIMARY HYPERTENSION: ICD-10-CM

## 2024-08-19 RX ORDER — AMLODIPINE BESYLATE 5 MG/1
5 TABLET ORAL DAILY
Qty: 30 TABLET | Refills: 0 | Status: SHIPPED | OUTPATIENT
Start: 2024-08-19

## 2024-08-30 DIAGNOSIS — I10 PRIMARY HYPERTENSION: ICD-10-CM

## 2024-08-30 DIAGNOSIS — E11.9 TYPE 2 DIABETES MELLITUS WITHOUT COMPLICATION, WITHOUT LONG-TERM CURRENT USE OF INSULIN (MULTI): ICD-10-CM

## 2024-08-30 RX ORDER — METFORMIN HYDROCHLORIDE 500 MG/1
500 TABLET ORAL
Qty: 60 TABLET | Refills: 0 | Status: SHIPPED | OUTPATIENT
Start: 2024-08-30

## 2024-08-30 RX ORDER — CHLORTHALIDONE 25 MG/1
TABLET ORAL
Qty: 30 TABLET | Refills: 0 | Status: SHIPPED | OUTPATIENT
Start: 2024-08-30

## 2024-10-02 DIAGNOSIS — I10 PRIMARY HYPERTENSION: ICD-10-CM

## 2024-10-02 DIAGNOSIS — E11.9 TYPE 2 DIABETES MELLITUS WITHOUT COMPLICATION, WITHOUT LONG-TERM CURRENT USE OF INSULIN (MULTI): ICD-10-CM

## 2024-10-02 RX ORDER — POTASSIUM CHLORIDE 750 MG/1
CAPSULE, EXTENDED RELEASE ORAL
Qty: 90 CAPSULE | Refills: 0 | Status: SHIPPED | OUTPATIENT
Start: 2024-10-02

## 2024-10-02 RX ORDER — CHLORTHALIDONE 25 MG/1
TABLET ORAL
Qty: 30 TABLET | Refills: 0 | Status: SHIPPED | OUTPATIENT
Start: 2024-10-02

## 2024-10-02 RX ORDER — METFORMIN HYDROCHLORIDE 500 MG/1
500 TABLET ORAL
Qty: 60 TABLET | Refills: 0 | Status: SHIPPED | OUTPATIENT
Start: 2024-10-02

## 2024-10-09 DIAGNOSIS — I10 PRIMARY HYPERTENSION: ICD-10-CM

## 2024-10-10 RX ORDER — AMLODIPINE BESYLATE 5 MG/1
5 TABLET ORAL DAILY
Qty: 30 TABLET | Refills: 0 | Status: SHIPPED | OUTPATIENT
Start: 2024-10-10 | End: 2024-10-11 | Stop reason: SDUPTHER

## 2024-10-11 ENCOUNTER — APPOINTMENT (OUTPATIENT)
Dept: PRIMARY CARE | Facility: CLINIC | Age: 64
End: 2024-10-11
Payer: COMMERCIAL

## 2024-10-11 VITALS
HEIGHT: 69 IN | TEMPERATURE: 97.3 F | WEIGHT: 230.8 LBS | OXYGEN SATURATION: 97 % | BODY MASS INDEX: 34.18 KG/M2 | HEART RATE: 90 BPM | DIASTOLIC BLOOD PRESSURE: 74 MMHG | SYSTOLIC BLOOD PRESSURE: 122 MMHG

## 2024-10-11 DIAGNOSIS — Z00.00 ROUTINE GENERAL MEDICAL EXAMINATION AT A HEALTH CARE FACILITY: Primary | ICD-10-CM

## 2024-10-11 DIAGNOSIS — I10 PRIMARY HYPERTENSION: ICD-10-CM

## 2024-10-11 DIAGNOSIS — K21.9 GASTROESOPHAGEAL REFLUX DISEASE, UNSPECIFIED WHETHER ESOPHAGITIS PRESENT: ICD-10-CM

## 2024-10-11 DIAGNOSIS — Z12.5 PROSTATE CANCER SCREENING: ICD-10-CM

## 2024-10-11 DIAGNOSIS — L30.9 ECZEMA, UNSPECIFIED TYPE: ICD-10-CM

## 2024-10-11 PROCEDURE — 3048F LDL-C <100 MG/DL: CPT | Performed by: FAMILY MEDICINE

## 2024-10-11 PROCEDURE — 1036F TOBACCO NON-USER: CPT | Performed by: FAMILY MEDICINE

## 2024-10-11 PROCEDURE — 99213 OFFICE O/P EST LOW 20 MIN: CPT | Performed by: FAMILY MEDICINE

## 2024-10-11 PROCEDURE — 3078F DIAST BP <80 MM HG: CPT | Performed by: FAMILY MEDICINE

## 2024-10-11 PROCEDURE — 99396 PREV VISIT EST AGE 40-64: CPT | Performed by: FAMILY MEDICINE

## 2024-10-11 PROCEDURE — 2028F FOOT EXAM PERFORMED: CPT | Performed by: FAMILY MEDICINE

## 2024-10-11 PROCEDURE — 3074F SYST BP LT 130 MM HG: CPT | Performed by: FAMILY MEDICINE

## 2024-10-11 PROCEDURE — 3008F BODY MASS INDEX DOCD: CPT | Performed by: FAMILY MEDICINE

## 2024-10-11 PROCEDURE — 3051F HG A1C>EQUAL 7.0%<8.0%: CPT | Performed by: FAMILY MEDICINE

## 2024-10-11 RX ORDER — METOPROLOL SUCCINATE 25 MG/1
25 TABLET, EXTENDED RELEASE ORAL DAILY
Qty: 90 TABLET | Refills: 3 | Status: SHIPPED | OUTPATIENT
Start: 2024-10-11 | End: 2025-10-11

## 2024-10-11 RX ORDER — CHLORTHALIDONE 25 MG/1
25 TABLET ORAL DAILY
Qty: 90 TABLET | Refills: 3 | Status: SHIPPED | OUTPATIENT
Start: 2024-10-11 | End: 2025-10-11

## 2024-10-11 RX ORDER — POTASSIUM CHLORIDE 750 MG/1
10 CAPSULE, EXTENDED RELEASE ORAL DAILY
Qty: 90 CAPSULE | Refills: 0 | Status: SHIPPED | OUTPATIENT
Start: 2024-10-11

## 2024-10-11 RX ORDER — AMLODIPINE BESYLATE 5 MG/1
5 TABLET ORAL DAILY
Qty: 90 TABLET | Refills: 3 | Status: SHIPPED | OUTPATIENT
Start: 2024-10-11 | End: 2025-10-11

## 2024-10-11 RX ORDER — ESOMEPRAZOLE MAGNESIUM 40 MG/1
40 CAPSULE, DELAYED RELEASE ORAL DAILY
Qty: 90 CAPSULE | Refills: 3 | Status: SHIPPED | OUTPATIENT
Start: 2024-10-11 | End: 2025-10-11

## 2024-10-11 RX ORDER — CLOBETASOL PROPIONATE 0.5 MG/G
OINTMENT TOPICAL 2 TIMES DAILY
Qty: 30 G | Refills: 3 | Status: SHIPPED | OUTPATIENT
Start: 2024-10-11 | End: 2025-06-08

## 2024-10-11 ASSESSMENT — ENCOUNTER SYMPTOMS
OCCASIONAL FEELINGS OF UNSTEADINESS: 0
LOSS OF SENSATION IN FEET: 1
DEPRESSION: 0

## 2024-10-11 NOTE — PROGRESS NOTES
"Subjective   Patient ID: Silvestre Ortiz is a 64 y.o. male who presents for Annual Exam.    Patient presenting for annual wellness exam.  His lab work is up-to-date with lipid panel being done in 2024 his triglycerides were 318 and his LDL was 10 his total was 124.  Hemoglobin A1c at that time was 7.7%.But more recently on  was 6.7% in the office.  Will order PSA to be done with his next set of labs which will likely be from the January timeframe when he sees his endocrinologist.  He had a diagnostic colonoscopy in 2023Recall 5 years due to personal history of adenomatous polyps    Social History     Tobacco Use   Smoking Status Former    Current packs/day: 0.00    Average packs/day: 1.5 packs/day for 18.0 years (27.0 ttl pk-yrs)    Types: Cigarettes    Start date:     Quit date:     Years since quittin.7    Passive exposure: Past   Smokeless Tobacco Never    AAA Screening: AAA screening not indicated until age 66 based on his smoking history.  He did have a CT abdomen pelvis done in  and at that time there was no sign of AAA or dilation of the aorta.      Patient notes that he has had an itchy rash on his feet that intermittently occurs it does look like there are little water-filled pustules underneath the skin when it happens he said he has been treated for athlete's foot multiple times in the past with various creams and oral medications including terbinafine.  Nothing is ever helped go away.  Objective   /74 (BP Location: Left arm, Patient Position: Sitting, BP Cuff Size: Large adult)   Pulse 90   Temp 36.3 °C (97.3 °F) (Skin)   Ht 1.74 m (5' 8.5\")   Wt 105 kg (230 lb 12.8 oz)   SpO2 97%   BMI 34.58 kg/m²     Physical Exam:     Constitutional:   General: not in acute distress  Appearance: normal appearance, non-toxic, not ill-appearing or diaphoretic.   HENT:   Head: Normocephalic and atraumatic  Eyes: EOMI, PERRL  CV: RRR, Normal Pulses, Normal Heart " sounds  Pulm: CTAB, effort normal  Neuro: CN II-XII Intact, alert, oriented x3      Assessment/Plan     64-year-old male presenting for annual wellness exam.  He is up-to-date on most screening exams will need updated PSA he will get this around the time that he gets lab work done for his endocrinologist.  Additionally it sounds like the rash in his feet might be more so related to eczema as opposed to tinea pedis.  discuss treatment with clobetasol ointment to use 2 weeks on 2 weeks off.    Problem List Items Addressed This Visit       Eczema    Relevant Medications    clobetasol (Temovate) 0.05 % ointment    GERD (gastroesophageal reflux disease)    Relevant Medications    esomeprazole (NexIUM) 40 mg DR capsule    Hypertension    Overview     BP Controlled   - Amlodipine 5mg   - chlorthalidone 25mg   - metoprolol 25mg          Relevant Medications    amLODIPine (Norvasc) 5 mg tablet    chlorthalidone (Hygroton) 25 mg tablet    potassium chloride ER (Micro-K) 10 mEq ER capsule    metoprolol succinate XL (Toprol-XL) 25 mg 24 hr tablet     Other Visit Diagnoses       Routine general medical examination at a health care facility    -  Primary    Prostate cancer screening        Relevant Orders    PSA                  DO LINDSAY Rey spent a total of 42 minutes on the date of the service which included preparing to see the patient, face-to-face patient care, completing clinical documentation, performing a medically appropriate examination, counseling and educating the patient/family/caregiver and ordering medications, tests, or procedures.

## 2024-10-31 DIAGNOSIS — E11.9 TYPE 2 DIABETES MELLITUS WITHOUT COMPLICATION, WITHOUT LONG-TERM CURRENT USE OF INSULIN (MULTI): ICD-10-CM

## 2024-10-31 RX ORDER — METFORMIN HYDROCHLORIDE 500 MG/1
500 TABLET ORAL
Qty: 60 TABLET | Refills: 0 | Status: SHIPPED | OUTPATIENT
Start: 2024-10-31

## 2024-11-15 ENCOUNTER — PATIENT MESSAGE (OUTPATIENT)
Dept: PRIMARY CARE | Facility: CLINIC | Age: 64
End: 2024-11-15
Payer: COMMERCIAL

## 2024-11-15 DIAGNOSIS — L30.9 ECZEMA, UNSPECIFIED TYPE: Primary | ICD-10-CM

## 2024-11-15 RX ORDER — CLOTRIMAZOLE AND BETAMETHASONE DIPROPIONATE 10; .64 MG/G; MG/G
CREAM TOPICAL
Qty: 45 G | Refills: 0 | Status: SHIPPED | OUTPATIENT
Start: 2024-11-15

## 2024-11-29 DIAGNOSIS — E11.9 TYPE 2 DIABETES MELLITUS WITHOUT COMPLICATION, WITHOUT LONG-TERM CURRENT USE OF INSULIN (MULTI): ICD-10-CM

## 2024-12-02 RX ORDER — METFORMIN HYDROCHLORIDE 500 MG/1
500 TABLET ORAL
Qty: 60 TABLET | Refills: 0 | Status: SHIPPED | OUTPATIENT
Start: 2024-12-02

## 2024-12-08 DIAGNOSIS — E78.2 MIXED HYPERLIPIDEMIA: ICD-10-CM

## 2024-12-09 RX ORDER — ROSUVASTATIN CALCIUM 20 MG/1
20 TABLET, COATED ORAL DAILY
Qty: 90 TABLET | Refills: 0 | Status: SHIPPED | OUTPATIENT
Start: 2024-12-09

## 2024-12-10 DIAGNOSIS — L30.9 ECZEMA, UNSPECIFIED TYPE: ICD-10-CM

## 2024-12-11 RX ORDER — CLOTRIMAZOLE AND BETAMETHASONE DIPROPIONATE 10; .64 MG/G; MG/G
CREAM TOPICAL
Qty: 45 G | Refills: 0 | Status: SHIPPED | OUTPATIENT
Start: 2024-12-11

## 2024-12-13 NOTE — PROGRESS NOTES
FUV for diabetes. LV with me 08/2024.    History of Present Illness   64 y.o. male with hx of type 2 diabetes, HTN, HLD, lung cancer s/p VATS wedge resection (2009).     Dx: 2021  HbA1c: 5.7% (12/2024), 6.7% (8/7/2024), 7.7% (03/2024), 7.3% (1/2/2024), 6.8% (08/2023), 10.3% (05/25/2023), 10.1% (12/2022)  Current regimen: metformin 500 mg BID, Ozempic 1 mg/week  Past medications: none  Complications: none  Comorbidities: HTN, HLD, obesity     SMBG: once a day (random)  Mostly in the mid to high 100s     Hypoglycemia: none     Diet: three times a day  Breakfast: breakfast sandwich, greek yogurt  Lunch: sandwich/dinner leftovers  Dinner: pasta, protein  Snacks: doritos, michael chips, olives, nuts  Drinks: unsweetened iced tea or water, wine, 3 glasses; 4-5 days per week     Exercise: no    Today:  -doing well  -discovered that the red peppers in the breakfast sandwich he was eating was the cause of his symptoms!     ROS  General: no fever or chills  CV: no chest pain   Respiratory: no shortness of breath  MSK: no lower extremity edema  Neuro: no headache or dizziness  See HPI for Endocrine ROS    Past Medical History:   Diagnosis Date    Malignant neoplasm of lung (Multi) 02/04/2023    Personal history of nicotine dependence     Former smoker    Personal history of other specified conditions     History of abdominal pain    Positive colorectal cancer screening using Cologuard test 02/04/2023    Status post appendectomy 02/04/2023       Past Surgical History:   Procedure Laterality Date    OTHER SURGICAL HISTORY  11/27/2019    Appendectomy    OTHER SURGICAL HISTORY  06/09/2017    Wedge Resection Of Lung       Social History     Socioeconomic History    Marital status:      Spouse name: Not on file    Number of children: Not on file    Years of education: Not on file    Highest education level: Not on file   Occupational History    Not on file   Tobacco Use    Smoking status: Former     Current packs/day: 0.00      "Average packs/day: 1.5 packs/day for 18.0 years (27.0 ttl pk-yrs)     Types: Cigarettes     Start date:      Quit date:      Years since quittin.9     Passive exposure: Past    Smokeless tobacco: Never   Substance and Sexual Activity    Alcohol use: Yes     Alcohol/week: 8.0 standard drinks of alcohol     Types: 8 Glasses of wine per week    Drug use: Not Currently    Sexual activity: Not on file   Other Topics Concern    Not on file   Social History Narrative    Not on file     Social Drivers of Health     Financial Resource Strain: Not on file   Food Insecurity: Not on file   Transportation Needs: Not on file   Physical Activity: Not on file   Stress: Not on file   Social Connections: Not on file   Intimate Partner Violence: Not on file   Housing Stability: Not on file       Physical Exam   height is 1.74 m (5' 8.5\") and weight is 105 kg (231 lb). His blood pressure is 124/82 and his pulse is 99.   General: not in acute distress  HEENT: HARPAL, EOMI  Thyroid: no goiter  Neuro: alert and oriented x 3    Current Outpatient Medications   Medication Sig Dispense Refill    amLODIPine (Norvasc) 5 mg tablet Take 1 tablet (5 mg) by mouth once daily. 90 tablet 3    chlorthalidone (Hygroton) 25 mg tablet Take 1 tablet (25 mg) by mouth once daily. 90 tablet 3    clobetasol (Temovate) 0.05 % ointment Apply topically 2 times a day. 30 g 3    clotrimazole-betamethasone (Lotrisone) cream apply topically to affected and surrounding areas twice daily in the morning and evening 45 g 0    esomeprazole (NexIUM) 40 mg DR capsule Take 1 capsule (40 mg) by mouth once daily. 90 capsule 3    metoprolol succinate XL (Toprol-XL) 25 mg 24 hr tablet Take 1 tablet (25 mg) by mouth once daily. 90 tablet 3    potassium chloride ER (Micro-K) 10 mEq ER capsule Take 1 capsule (10 mEq) by mouth once daily. Do not crush or chew. 90 capsule 0    dapaglifloz propaned-metformin (Xigduo XR) 5-500 mg Take 1 tablet by mouth once daily. 90 tablet " 3    rosuvastatin (Crestor) 20 mg tablet Take 1 tablet (20 mg) by mouth once daily. 90 tablet 3    [START ON 12/22/2024] semaglutide (Ozempic) 1 mg/dose (4 mg/3 mL) pen injector Inject 1 mg under the skin 1 (one) time per week. 9 mL 3     No current facility-administered medications for this visit.       Assessment and Plan  64 y.o. male with hx of type 2 diabetes, HTN, HLD, lung cancer s/p VATS wedge resection (2009), here for management of diabetes.     1. Type 2 diabetes mellitus  HbA1c: 5.7% (12/2024), 6.7% (8/7/2024), 7.7% (03/2024), 7.3% (1/2/2024), 6.8% (08/2023), 10.3% (05/25/2023), 10.1% (12/2022)  Current regimen: metformin 500 mg BID, Ozempic 1 mg/week  Eye exam: no DR (06/2024)  Urine microalbumin: 283 micrograms/mg (12/2024)  Podiatry: LV 01/2023  Lipids: HDL 50, LDL 16,  (12/2024) on rosuvastatin 20 mg     A1c: 5.7%!  Glycemic control is excellent.  Tolerating Ozempic 1 mg dose without any issues.    Worsening microalbuminuria. He is agreeable to starting a SGLT2i for kidney protection.  Will see if Xigduo is covered by insurance. He will let me know if it is not.  Can also try for Synjardy but he is also agreeable to taking Farxiga or Jardiance separately if he needs to.  TG above goal but improving. Discussed goal of < 150.    PLAN:  -continue Ozempic 1 mg/week  -start Xigduo 5-500 mg BID  -stop metformin  -check blood sugars once a day (alternate fasting and bedtime)  -continue rosuvastatin 20 mg QDAY  -exercise 30 mins per day, 4-5 days per week  -reduce wine to 1 glass/day  -check HbA1c and urine microalbumin before next visit    Follow-up in 4 months (labs prior)  Uses MyChart.

## 2024-12-16 ENCOUNTER — LAB (OUTPATIENT)
Dept: LAB | Facility: LAB | Age: 64
End: 2024-12-16
Payer: COMMERCIAL

## 2024-12-16 DIAGNOSIS — E11.65 TYPE 2 DIABETES MELLITUS WITH HYPERGLYCEMIA, WITH LONG-TERM CURRENT USE OF INSULIN: ICD-10-CM

## 2024-12-16 DIAGNOSIS — Z79.4 TYPE 2 DIABETES MELLITUS WITH HYPERGLYCEMIA, WITH LONG-TERM CURRENT USE OF INSULIN: ICD-10-CM

## 2024-12-16 LAB
ALBUMIN SERPL BCP-MCNC: 4.3 G/DL (ref 3.4–5)
ALP SERPL-CCNC: 72 U/L (ref 33–136)
ALT SERPL W P-5'-P-CCNC: 29 U/L (ref 10–52)
ANION GAP SERPL CALC-SCNC: 13 MMOL/L (ref 10–20)
AST SERPL W P-5'-P-CCNC: 23 U/L (ref 9–39)
BILIRUB SERPL-MCNC: 2 MG/DL (ref 0–1.2)
BUN SERPL-MCNC: 11 MG/DL (ref 6–23)
CALCIUM SERPL-MCNC: 9.2 MG/DL (ref 8.6–10.3)
CHLORIDE SERPL-SCNC: 92 MMOL/L (ref 98–107)
CHOLEST SERPL-MCNC: 113 MG/DL (ref 0–199)
CHOLESTEROL/HDL RATIO: 2.2
CO2 SERPL-SCNC: 34 MMOL/L (ref 21–32)
CREAT SERPL-MCNC: 0.83 MG/DL (ref 0.5–1.3)
CREAT UR-MCNC: 84.1 MG/DL (ref 20–370)
EGFRCR SERPLBLD CKD-EPI 2021: >90 ML/MIN/1.73M*2
EST. AVERAGE GLUCOSE BLD GHB EST-MCNC: 117 MG/DL
GLUCOSE SERPL-MCNC: 137 MG/DL (ref 74–99)
HBA1C MFR BLD: 5.7 %
HDLC SERPL-MCNC: 50.4 MG/DL
LDLC SERPL CALC-MCNC: 16 MG/DL
MICROALBUMIN UR-MCNC: 238 MG/L
MICROALBUMIN/CREAT UR: 283 UG/MG CREAT
NON HDL CHOLESTEROL: 63 MG/DL (ref 0–149)
POTASSIUM SERPL-SCNC: 3.2 MMOL/L (ref 3.5–5.3)
PROT SERPL-MCNC: 6.8 G/DL (ref 6.4–8.2)
SODIUM SERPL-SCNC: 136 MMOL/L (ref 136–145)
TRIGL SERPL-MCNC: 235 MG/DL (ref 0–149)
VLDL: 47 MG/DL (ref 0–40)

## 2024-12-16 PROCEDURE — 83036 HEMOGLOBIN GLYCOSYLATED A1C: CPT

## 2024-12-16 PROCEDURE — 80061 LIPID PANEL: CPT

## 2024-12-16 PROCEDURE — 80053 COMPREHEN METABOLIC PANEL: CPT

## 2024-12-16 PROCEDURE — 36415 COLL VENOUS BLD VENIPUNCTURE: CPT

## 2024-12-16 PROCEDURE — 82570 ASSAY OF URINE CREATININE: CPT

## 2024-12-16 PROCEDURE — 82043 UR ALBUMIN QUANTITATIVE: CPT

## 2024-12-19 ENCOUNTER — APPOINTMENT (OUTPATIENT)
Dept: ENDOCRINOLOGY | Facility: CLINIC | Age: 64
End: 2024-12-19
Payer: COMMERCIAL

## 2024-12-19 VITALS
HEART RATE: 99 BPM | DIASTOLIC BLOOD PRESSURE: 82 MMHG | SYSTOLIC BLOOD PRESSURE: 124 MMHG | WEIGHT: 231 LBS | BODY MASS INDEX: 34.21 KG/M2 | HEIGHT: 69 IN

## 2024-12-19 DIAGNOSIS — E78.2 MIXED HYPERLIPIDEMIA: ICD-10-CM

## 2024-12-19 DIAGNOSIS — Z79.4 TYPE 2 DIABETES MELLITUS WITH HYPERGLYCEMIA, WITH LONG-TERM CURRENT USE OF INSULIN: ICD-10-CM

## 2024-12-19 DIAGNOSIS — E11.65 TYPE 2 DIABETES MELLITUS WITH HYPERGLYCEMIA, WITH LONG-TERM CURRENT USE OF INSULIN: ICD-10-CM

## 2024-12-19 PROCEDURE — 3074F SYST BP LT 130 MM HG: CPT | Performed by: STUDENT IN AN ORGANIZED HEALTH CARE EDUCATION/TRAINING PROGRAM

## 2024-12-19 PROCEDURE — 2028F FOOT EXAM PERFORMED: CPT | Performed by: STUDENT IN AN ORGANIZED HEALTH CARE EDUCATION/TRAINING PROGRAM

## 2024-12-19 PROCEDURE — 3044F HG A1C LEVEL LT 7.0%: CPT | Performed by: STUDENT IN AN ORGANIZED HEALTH CARE EDUCATION/TRAINING PROGRAM

## 2024-12-19 PROCEDURE — 99215 OFFICE O/P EST HI 40 MIN: CPT | Performed by: STUDENT IN AN ORGANIZED HEALTH CARE EDUCATION/TRAINING PROGRAM

## 2024-12-19 PROCEDURE — 3008F BODY MASS INDEX DOCD: CPT | Performed by: STUDENT IN AN ORGANIZED HEALTH CARE EDUCATION/TRAINING PROGRAM

## 2024-12-19 PROCEDURE — 3079F DIAST BP 80-89 MM HG: CPT | Performed by: STUDENT IN AN ORGANIZED HEALTH CARE EDUCATION/TRAINING PROGRAM

## 2024-12-19 PROCEDURE — 3048F LDL-C <100 MG/DL: CPT | Performed by: STUDENT IN AN ORGANIZED HEALTH CARE EDUCATION/TRAINING PROGRAM

## 2024-12-19 PROCEDURE — 3060F POS MICROALBUMINURIA REV: CPT | Performed by: STUDENT IN AN ORGANIZED HEALTH CARE EDUCATION/TRAINING PROGRAM

## 2024-12-19 RX ORDER — ROSUVASTATIN CALCIUM 20 MG/1
20 TABLET, COATED ORAL DAILY
Qty: 90 TABLET | Refills: 3 | Status: SHIPPED | OUTPATIENT
Start: 2024-12-19

## 2024-12-19 RX ORDER — SEMAGLUTIDE 1.34 MG/ML
1 INJECTION, SOLUTION SUBCUTANEOUS
Qty: 9 ML | Refills: 3 | Status: SHIPPED | OUTPATIENT
Start: 2024-12-22

## 2024-12-19 ASSESSMENT — ENCOUNTER SYMPTOMS
DEPRESSION: 0
OCCASIONAL FEELINGS OF UNSTEADINESS: 0
LOSS OF SENSATION IN FEET: 1

## 2024-12-19 ASSESSMENT — PATIENT HEALTH QUESTIONNAIRE - PHQ9
SUM OF ALL RESPONSES TO PHQ9 QUESTIONS 1 AND 2: 0
2. FEELING DOWN, DEPRESSED OR HOPELESS: NOT AT ALL
1. LITTLE INTEREST OR PLEASURE IN DOING THINGS: NOT AT ALL

## 2024-12-19 NOTE — PATIENT INSTRUCTIONS
I am sending you a medication called Xigduo (this is a combination pill of metformin + Farxiga). You will take this 1 tab, twice a day just like metformin.  You can stop the metformin when you start Xigduo  Please let me know if this medication is not covered by your insurance  Continue Ozempic 1 mg, once a week as you are

## 2024-12-23 DIAGNOSIS — E11.65 TYPE 2 DIABETES MELLITUS WITH HYPERGLYCEMIA, WITH LONG-TERM CURRENT USE OF INSULIN: ICD-10-CM

## 2024-12-23 DIAGNOSIS — Z79.4 TYPE 2 DIABETES MELLITUS WITH HYPERGLYCEMIA, WITH LONG-TERM CURRENT USE OF INSULIN: ICD-10-CM

## 2025-01-02 ENCOUNTER — OFFICE VISIT (OUTPATIENT)
Dept: URGENT CARE | Age: 65
End: 2025-01-02
Payer: COMMERCIAL

## 2025-01-02 VITALS
OXYGEN SATURATION: 97 % | DIASTOLIC BLOOD PRESSURE: 91 MMHG | HEART RATE: 128 BPM | RESPIRATION RATE: 14 BRPM | TEMPERATURE: 100.5 F | SYSTOLIC BLOOD PRESSURE: 163 MMHG

## 2025-01-02 DIAGNOSIS — R30.0 DYSURIA: ICD-10-CM

## 2025-01-02 DIAGNOSIS — N30.01 ACUTE CYSTITIS WITH HEMATURIA: Primary | ICD-10-CM

## 2025-01-02 DIAGNOSIS — R50.9 FEVER, UNSPECIFIED FEVER CAUSE: ICD-10-CM

## 2025-01-02 DIAGNOSIS — R05.1 ACUTE COUGH: ICD-10-CM

## 2025-01-02 DIAGNOSIS — R81 GLUCOSE FOUND IN URINE ON EXAMINATION: ICD-10-CM

## 2025-01-02 LAB
POC APPEARANCE, URINE: ABNORMAL
POC BILIRUBIN, URINE: NEGATIVE
POC BLOOD, URINE: ABNORMAL
POC COLOR, URINE: ABNORMAL
POC FINGERSTICK BLOOD GLUCOSE: 165 MG/DL (ref 70–100)
POC GLUCOSE, URINE: ABNORMAL MG/DL
POC KETONES, URINE: ABNORMAL MG/DL
POC LEUKOCYTES, URINE: NEGATIVE
POC NITRITE,URINE: NEGATIVE
POC PH, URINE: 6 PH
POC PROTEIN, URINE: ABNORMAL MG/DL
POC RAPID INFLUENZA A: NEGATIVE
POC RAPID INFLUENZA B: NEGATIVE
POC SPECIFIC GRAVITY, URINE: 1.02
POC UROBILINOGEN, URINE: 0.2 EU/DL

## 2025-01-02 PROCEDURE — 87186 SC STD MICRODIL/AGAR DIL: CPT

## 2025-01-02 PROCEDURE — 87086 URINE CULTURE/COLONY COUNT: CPT

## 2025-01-02 RX ORDER — ACETAMINOPHEN 500 MG
1000 TABLET ORAL ONCE
Status: COMPLETED | OUTPATIENT
Start: 2025-01-02 | End: 2025-01-02

## 2025-01-02 RX ORDER — CEFUROXIME AXETIL 500 MG/1
500 TABLET ORAL 2 TIMES DAILY
Qty: 20 TABLET | Refills: 0 | Status: SHIPPED | OUTPATIENT
Start: 2025-01-02 | End: 2025-01-12

## 2025-01-02 RX ADMIN — Medication 1000 MG: at 15:58

## 2025-01-02 ASSESSMENT — ENCOUNTER SYMPTOMS
FEVER: 1
CHILLS: 1
ABDOMINAL PAIN: 0
SINUS PRESSURE: 0
WHEEZING: 0
FREQUENCY: 1
SORE THROAT: 0
DYSURIA: 1
COUGH: 1
RHINORRHEA: 0
HEADACHES: 0
CHEST TIGHTNESS: 0
SHORTNESS OF BREATH: 0
NAUSEA: 1
SINUS PAIN: 0
VOMITING: 1

## 2025-01-02 NOTE — PROGRESS NOTES
Subjective   Patient ID: Silvestre Ortiz is a 64 y.o. male. They present today with a chief complaint of Cough, Fever, Vomiting, and Difficulty Urinating (Sick X 1 day. TD-MA).    History of Present Illness    Cough  Associated symptoms include chills and a fever. Pertinent negatives include no ear pain, headaches, rhinorrhea, sore throat, shortness of breath or wheezing.   Fever   Associated symptoms include coughing, nausea, urinary pain and vomiting. Pertinent negatives include no abdominal pain, congestion, ear pain, headaches, sore throat or wheezing.   Vomiting  Associated symptoms: chills, cough and fever    Associated symptoms: no abdominal pain, no headaches and no sore throat    Difficulty Urinating  Presenting symptoms: dysuria    Associated symptoms: fever, nausea, urinary frequency and vomiting    Associated symptoms: no abdominal pain        Patient presents to urgent care for complaints of dry cough, fever, and dysuria starting 1 day.  Patient has a past medical history of diabetes.  He states that he did take Advil earlier this morning.  Past Medical History  Allergies as of 01/02/2025 - Reviewed 01/02/2025   Allergen Reaction Noted    Adhesive tape-silicones Unknown 02/04/2023       (Not in a hospital admission)       Past Medical History:   Diagnosis Date    Malignant neoplasm of lung (Multi) 02/04/2023    Personal history of nicotine dependence     Former smoker    Personal history of other specified conditions     History of abdominal pain    Positive colorectal cancer screening using Cologuard test 02/04/2023    Status post appendectomy 02/04/2023       Past Surgical History:   Procedure Laterality Date    OTHER SURGICAL HISTORY  11/27/2019    Appendectomy    OTHER SURGICAL HISTORY  06/09/2017    Wedge Resection Of Lung        reports that he quit smoking about 29 years ago. His smoking use included cigarettes. He started smoking about 47 years ago. He has a 27 pack-year smoking history. He  has been exposed to tobacco smoke. He has never used smokeless tobacco. He reports current alcohol use of about 8.0 standard drinks of alcohol per week. He reports that he does not currently use drugs.    Review of Systems  Review of Systems   Constitutional:  Positive for chills and fever.   HENT:  Negative for congestion, ear pain, rhinorrhea, sinus pressure, sinus pain and sore throat.    Respiratory:  Positive for cough. Negative for chest tightness, shortness of breath and wheezing.    Gastrointestinal:  Positive for nausea and vomiting. Negative for abdominal pain.   Genitourinary:  Positive for dysuria, frequency and urgency.   Neurological:  Negative for headaches.                                  Objective    Vitals:    01/02/25 1601   BP: (!) 163/91   Pulse: (!) 128   Resp: 14   Temp: (!) 38.1 °C (100.5 °F)   SpO2: 97%     No LMP for male patient.    Physical Exam  Vitals reviewed.   Constitutional:       Appearance: Normal appearance.   HENT:      Head: Normocephalic.      Right Ear: Tympanic membrane, ear canal and external ear normal.      Left Ear: Tympanic membrane, ear canal and external ear normal.      Nose: Nose normal.      Mouth/Throat:      Mouth: Mucous membranes are moist.   Eyes:      Conjunctiva/sclera: Conjunctivae normal.   Cardiovascular:      Rate and Rhythm: Regular rhythm. Tachycardia present.      Heart sounds: Normal heart sounds.   Pulmonary:      Effort: Pulmonary effort is normal.      Breath sounds: Normal breath sounds.   Abdominal:      General: Abdomen is flat. Bowel sounds are normal.      Palpations: Abdomen is soft.      Tenderness: There is no left CVA tenderness.   Musculoskeletal:      Cervical back: Neck supple.   Skin:     General: Skin is warm and dry.   Neurological:      Mental Status: He is alert.         Procedures    Point of Care Test & Imaging Results from this visit  Results for orders placed or performed in visit on 01/02/25   POCT UA Automated manually  resulted   Result Value Ref Range    POC Color, Urine Dark Mindy (A) Straw, Yellow, Light-Yellow    POC Appearance, Urine Cloudy (A) Clear    POC Glucose, Urine >=1000 (4+) (A) NEGATIVE mg/dl    POC Bilirubin, Urine NEGATIVE NEGATIVE    POC Ketones, Urine 40 (2+) (A) NEGATIVE mg/dl    POC Specific Gravity, Urine 1.020 1.005 - 1.035    POC Blood, Urine LARGE (3+) (A) NEGATIVE    POC PH, Urine 6.0 No Reference Range Established PH    POC Protein, Urine 100 (2+) (A) NEGATIVE mg/dl    POC Urobilinogen, Urine 0.2 0.2, 1.0 EU/DL    Poc Nitrite, Urine NEGATIVE NEGATIVE    POC Leukocytes, Urine NEGATIVE NEGATIVE   POCT fingerstick glucose manually resulted   Result Value Ref Range    POC Fingerstick Blood Glucose 165 (A) 70 - 100 mg/dl   POCT Influenza A/B manually resulted   Result Value Ref Range    POC Rapid Influenza A Negative Negative    POC Rapid Influenza B Negative Negative      No results found.    Diagnostic study results (if any) were reviewed by RAKEL Gonzalez.    Assessment/Plan   Allergies, medications, history, and pertinent labs/EKGs/Imaging reviewed by RAKEL Gonzalez.     Medical Decision Making  Patient presents  to urgent care for complaints of fever, dry cough, chills, and dysuria for 1 day.  Rapid flu was negative.  Urine dipstick did show 2+ protein, 3+ blood, 2+ ketones and 3+ glucose.  Patient does report a history of type 2 diabetes.  He is on metformin and Ozempic.  Accu-Chek was checked and blood sugar was 165.  Patient reports that he does check his blood sugars at home and they have been in the 150s.  Will start patient on Ceftin for acute cystitis with hematuria.  Patient had no CVA tenderness on physical exam.  Urine culture will be sent out.  Patient did have a temperature of 100.5 °F and was tachycardic in the 120s.  Patient was medicated with Tylenol and tachycardia likely due to his fever/infection.  Was told antibiotics may change pending urine culture results.   He was told to stay hydrated with fluids..  He may rotate between Tylenol and ibuprofen as needed for the fever.  If your symptoms are worsening in the next 24 to 48 hours, recommend going to the emergency room if needed.  Follow-up with your primary care provider.    At time of discharge patient was clinically well-appearing and HDS for outpatient management. The patient and/or family was educated regarding diagnosis, supportive care, OTC and Rx medications. The patient and/or family was given the opportunity to ask questions prior to discharge.  They verbalized understanding of my discussion of the plans for treatment, expected course, indications to return to  or seek further evaluation in ED, and the need for timely follow up as directed.   They were provided with a work/school excuse if requested.      Orders and Diagnoses  Diagnoses and all orders for this visit:  Acute cystitis with hematuria  -     cefuroxime (Ceftin) 500 mg tablet; Take 1 tablet (500 mg) by mouth 2 times a day for 10 days.  Fever, unspecified fever cause  -     acetaminophen (Tylenol) tablet 1,000 mg  -     POCT Influenza A/B manually resulted  Dysuria  -     Urine Culture  -     POCT UA Automated manually resulted  Glucose found in urine on examination  -     POCT fingerstick glucose manually resulted  Acute cough  -     POCT Influenza A/B manually resulted      Medical Admin Record  Administrations This Visit       acetaminophen (Tylenol) tablet 1,000 mg       Admin Date  01/02/2025 Action  Given Dose  1,000 mg Route  oral Documented By  Randi Hanna MA                    Patient disposition: Home    Electronically signed by RAKEL Gonzalez  4:13 PM    Patient urine culture received, showed that other antibiotics may be better choice patient will be started on Cipro, patient called and notified to stop Ceftin and start Cipro as shown susceptible-apd

## 2025-01-05 LAB — BACTERIA UR CULT: ABNORMAL

## 2025-01-05 RX ORDER — CIPROFLOXACIN 500 MG/1
500 TABLET ORAL 2 TIMES DAILY
Qty: 28 TABLET | Refills: 0 | Status: SHIPPED | OUTPATIENT
Start: 2025-01-05 | End: 2025-01-19

## 2025-01-22 ENCOUNTER — E-VISIT (OUTPATIENT)
Dept: PRIMARY CARE | Facility: CLINIC | Age: 65
End: 2025-01-22
Payer: COMMERCIAL

## 2025-01-22 DIAGNOSIS — R61 NIGHT SWEATS: Primary | ICD-10-CM

## 2025-01-22 DIAGNOSIS — R63.4 UNINTENTIONAL WEIGHT LOSS: ICD-10-CM

## 2025-01-24 RX ORDER — LEVOFLOXACIN 500 MG/1
500 TABLET, FILM COATED ORAL DAILY
Qty: 10 TABLET | Refills: 0 | Status: SHIPPED | OUTPATIENT
Start: 2025-01-24 | End: 2025-02-03

## 2025-01-24 NOTE — TELEPHONE ENCOUNTER
Tyler Monroy.  I hope all is well.  Sorry to bother you.  I was recently sick at the beginning of the month with a UTI and an upper respiratory virus.  The med center put me on 14 days of medication for the UTI caused by an Ecoli strain.  I finished the meds a few days ago.  The meds broke the fever and I think got rid of the UTI.  However, something doesn't feel quite right to me.  I still have a burning like sensation when I urinate.  I constantly feel like I have to go to the bathroom and when I do go, I immediately feel like I have to go again.  I can only describe it as it seems to me what an enlarged prostrate would feel like.  Not sure if that could be a side effect of the UTI or medication.  In addition, I've lost 15 pounds since Jan1.  Most of it was right away when I had the fever as I was having really bad night sweats where I would have to change my shirt several times a night.  I also had a lack of appetite a the time.  I thought I would contact you to see what you can suggest as like I said, something to me just doesn't feel right.     Please let me know when you have a chance.     Venkata    Let's evaluate this a little more;   I am going to place an order for blood work; if you have time, could you go to the lab to have the labwork completed;   I am also going to order another urinalysis to evaluate the urine to see if the infection improved;      Sometimes, the urinary tract infection could actually be something like prostatitis which is an infection of the prostate; this would require longer treatment with antibiotics. Since you were just recently on antibiotics there is a small chance that the urine test may come back negative but since there are a few days since then, it may still be helpful so we can start to determine what's going on.      Once we see the results; we should restart an antibiotic;   Since it is Friday and sometimes the lab / results are a little slower to come back over the  weekend; I will send in for an antibiotic that will cover the bacteria of the last urinary tract infection; don't start this until after you submit a urine specimen; if you start it before hand it will sterilize the urine and we won't be able to use that sample.       Will treat empirically for prostatitis with levofloxacin until thorough workup can be completed.

## 2025-01-27 ENCOUNTER — LAB (OUTPATIENT)
Dept: LAB | Facility: HOSPITAL | Age: 65
End: 2025-01-27
Payer: COMMERCIAL

## 2025-01-27 DIAGNOSIS — R61 NIGHT SWEATS: ICD-10-CM

## 2025-01-27 DIAGNOSIS — R63.4 UNINTENTIONAL WEIGHT LOSS: ICD-10-CM

## 2025-01-28 ENCOUNTER — PATIENT MESSAGE (OUTPATIENT)
Dept: PRIMARY CARE | Facility: CLINIC | Age: 65
End: 2025-01-28
Payer: COMMERCIAL

## 2025-01-28 DIAGNOSIS — N41.0 ACUTE PROSTATITIS: Primary | ICD-10-CM

## 2025-01-28 DIAGNOSIS — E87.6 HYPOKALEMIA: ICD-10-CM

## 2025-01-28 RX ORDER — LEVOFLOXACIN 500 MG/1
500 TABLET, FILM COATED ORAL DAILY
Qty: 35 TABLET | Refills: 0 | Status: SHIPPED | OUTPATIENT
Start: 2025-01-28 | End: 2025-03-14

## 2025-01-29 LAB
ALBUMIN SERPL-MCNC: 4.7 G/DL (ref 3.6–5.1)
ALP SERPL-CCNC: 83 U/L (ref 35–144)
ALT SERPL-CCNC: 17 U/L (ref 9–46)
ANION GAP SERPL CALCULATED.4IONS-SCNC: 11 MMOL/L (CALC) (ref 7–17)
APPEARANCE UR: CLEAR
AST SERPL-CCNC: 17 U/L (ref 10–35)
BACTERIA #/AREA URNS HPF: ABNORMAL /HPF
BACTERIA UR CULT: NORMAL
BASOPHILS # BLD AUTO: 44 CELLS/UL (ref 0–200)
BASOPHILS NFR BLD AUTO: 0.6 %
BILIRUB SERPL-MCNC: 2.2 MG/DL (ref 0.2–1.2)
BILIRUB UR QL STRIP: NEGATIVE
BUN SERPL-MCNC: 14 MG/DL (ref 7–25)
CALCIUM SERPL-MCNC: 9.4 MG/DL (ref 8.6–10.3)
CHLORIDE SERPL-SCNC: 89 MMOL/L (ref 98–110)
CO2 SERPL-SCNC: 35 MMOL/L (ref 20–32)
COLOR UR: YELLOW
CREAT SERPL-MCNC: 0.81 MG/DL (ref 0.7–1.35)
CRP SERPL-MCNC: 17.5 MG/L
EGFRCR SERPLBLD CKD-EPI 2021: 99 ML/MIN/1.73M2
EOSINOPHIL # BLD AUTO: 124 CELLS/UL (ref 15–500)
EOSINOPHIL NFR BLD AUTO: 1.7 %
ERYTHROCYTE [DISTWIDTH] IN BLOOD BY AUTOMATED COUNT: 13.5 % (ref 11–15)
ERYTHROCYTE [SEDIMENTATION RATE] IN BLOOD BY WESTERGREN METHOD: 77 MM/H
GLUCOSE SERPL-MCNC: 126 MG/DL (ref 65–99)
GLUCOSE UR QL STRIP: ABNORMAL
HCT VFR BLD AUTO: 44.6 % (ref 38.5–50)
HGB BLD-MCNC: 14.8 G/DL (ref 13.2–17.1)
HGB UR QL STRIP: NEGATIVE
HYALINE CASTS #/AREA URNS LPF: ABNORMAL /LPF
KETONES UR QL STRIP: NEGATIVE
LEUKOCYTE ESTERASE UR QL STRIP: NEGATIVE
LYMPHOCYTES # BLD AUTO: 1730 CELLS/UL (ref 850–3900)
LYMPHOCYTES NFR BLD AUTO: 23.7 %
MCH RBC QN AUTO: 31.6 PG (ref 27–33)
MCHC RBC AUTO-ENTMCNC: 33.2 G/DL (ref 32–36)
MCV RBC AUTO: 95.3 FL (ref 80–100)
MONOCYTES # BLD AUTO: 635 CELLS/UL (ref 200–950)
MONOCYTES NFR BLD AUTO: 8.7 %
NEUTROPHILS # BLD AUTO: 4767 CELLS/UL (ref 1500–7800)
NEUTROPHILS NFR BLD AUTO: 65.3 %
NITRITE UR QL STRIP: NEGATIVE
PH UR STRIP: 7 [PH] (ref 5–8)
PLATELET # BLD AUTO: 275 THOUSAND/UL (ref 140–400)
PMV BLD REES-ECKER: 9.9 FL (ref 7.5–12.5)
POTASSIUM SERPL-SCNC: 2.9 MMOL/L (ref 3.5–5.3)
PROT SERPL-MCNC: 7.7 G/DL (ref 6.1–8.1)
PROT UR QL STRIP: ABNORMAL
PSA SERPL-MCNC: 4.06 NG/ML
RBC # BLD AUTO: 4.68 MILLION/UL (ref 4.2–5.8)
RBC #/AREA URNS HPF: ABNORMAL /HPF
SERVICE CMNT-IMP: ABNORMAL
SODIUM SERPL-SCNC: 135 MMOL/L (ref 135–146)
SP GR UR STRIP: 1.03 (ref 1–1.03)
SQUAMOUS #/AREA URNS HPF: ABNORMAL /HPF
WBC # BLD AUTO: 7.3 THOUSAND/UL (ref 3.8–10.8)
WBC #/AREA URNS HPF: ABNORMAL /HPF

## 2025-01-31 ENCOUNTER — TELEPHONE (OUTPATIENT)
Dept: PRIMARY CARE | Facility: CLINIC | Age: 65
End: 2025-01-31
Payer: COMMERCIAL

## 2025-01-31 DIAGNOSIS — I10 PRIMARY HYPERTENSION: Primary | ICD-10-CM

## 2025-01-31 DIAGNOSIS — E87.6 HYPOKALEMIA: ICD-10-CM

## 2025-01-31 LAB
ANION GAP SERPL CALCULATED.4IONS-SCNC: 10 MMOL/L (CALC) (ref 7–17)
BUN SERPL-MCNC: 14 MG/DL (ref 7–25)
BUN/CREAT SERPL: ABNORMAL (CALC) (ref 6–22)
CALCIUM SERPL-MCNC: 9.4 MG/DL (ref 8.6–10.3)
CHLORIDE SERPL-SCNC: 93 MMOL/L (ref 98–110)
CO2 SERPL-SCNC: 34 MMOL/L (ref 20–32)
CREAT SERPL-MCNC: 0.89 MG/DL (ref 0.7–1.35)
EGFRCR SERPLBLD CKD-EPI 2021: 96 ML/MIN/1.73M2
GLUCOSE SERPL-MCNC: 126 MG/DL (ref 65–99)
POTASSIUM SERPL-SCNC: 2.9 MMOL/L (ref 3.5–5.3)
SODIUM SERPL-SCNC: 137 MMOL/L (ref 135–146)

## 2025-01-31 RX ORDER — POTASSIUM CHLORIDE 750 MG/1
20 CAPSULE, EXTENDED RELEASE ORAL 2 TIMES DAILY
Start: 2025-01-31 | End: 2025-02-03 | Stop reason: SDUPTHER

## 2025-02-03 DIAGNOSIS — E11.65 TYPE 2 DIABETES MELLITUS WITH HYPERGLYCEMIA, WITH LONG-TERM CURRENT USE OF INSULIN: Primary | ICD-10-CM

## 2025-02-03 DIAGNOSIS — Z79.4 TYPE 2 DIABETES MELLITUS WITH HYPERGLYCEMIA, WITH LONG-TERM CURRENT USE OF INSULIN: Primary | ICD-10-CM

## 2025-02-03 RX ORDER — POTASSIUM CHLORIDE 750 MG/1
20 CAPSULE, EXTENDED RELEASE ORAL 2 TIMES DAILY
Qty: 360 CAPSULE | Refills: 3 | Status: SHIPPED | OUTPATIENT
Start: 2025-02-03 | End: 2026-02-03

## 2025-02-03 RX ORDER — LANCETS 33 GAUGE
EACH MISCELLANEOUS
Qty: 100 EACH | Refills: 3 | Status: SHIPPED | OUTPATIENT
Start: 2025-02-03

## 2025-02-03 RX ORDER — BLOOD-GLUCOSE METER
EACH MISCELLANEOUS
Qty: 100 STRIP | Refills: 3 | Status: SHIPPED | OUTPATIENT
Start: 2025-02-03

## 2025-02-03 RX ORDER — LANCETS 30 GAUGE
EACH MISCELLANEOUS
Qty: 1 EACH | Refills: 0 | Status: SHIPPED | OUTPATIENT
Start: 2025-02-03

## 2025-02-03 RX ORDER — POTASSIUM CHLORIDE 750 MG/1
20 CAPSULE, EXTENDED RELEASE ORAL 2 TIMES DAILY
Qty: 16 CAPSULE | Refills: 0 | Status: SHIPPED | OUTPATIENT
Start: 2025-02-03 | End: 2025-02-03

## 2025-02-05 LAB
ANION GAP SERPL CALCULATED.4IONS-SCNC: 12 MMOL/L (CALC) (ref 7–17)
BUN SERPL-MCNC: 15 MG/DL (ref 7–25)
BUN/CREAT SERPL: ABNORMAL (CALC) (ref 6–22)
CALCIUM SERPL-MCNC: 9.8 MG/DL (ref 8.6–10.3)
CHLORIDE SERPL-SCNC: 93 MMOL/L (ref 98–110)
CO2 SERPL-SCNC: 33 MMOL/L (ref 20–32)
CREAT SERPL-MCNC: 0.85 MG/DL (ref 0.7–1.35)
EGFRCR SERPLBLD CKD-EPI 2021: 98 ML/MIN/1.73M2
GLUCOSE SERPL-MCNC: 97 MG/DL (ref 65–99)
POTASSIUM SERPL-SCNC: 3.7 MMOL/L (ref 3.5–5.3)
SODIUM SERPL-SCNC: 138 MMOL/L (ref 135–146)

## 2025-03-25 DIAGNOSIS — Z79.4 TYPE 2 DIABETES MELLITUS WITH HYPERGLYCEMIA, WITH LONG-TERM CURRENT USE OF INSULIN: Primary | ICD-10-CM

## 2025-03-25 DIAGNOSIS — R97.20 ELEVATED PSA: ICD-10-CM

## 2025-03-25 DIAGNOSIS — E11.65 TYPE 2 DIABETES MELLITUS WITH HYPERGLYCEMIA, WITH LONG-TERM CURRENT USE OF INSULIN: Primary | ICD-10-CM

## 2025-03-26 NOTE — PROGRESS NOTES
FUV for diabetes. LV with me 12/2024.    History of Present Illness   64 y.o. male with hx of type 2 diabetes, HTN, HLD, lung cancer s/p VATS wedge resection (2009).     Dx: 2021  HbA1c: 5.9% (03/2025), 5.7% (12/2024), 6.7% (8/7/2024), 7.7% (03/2024), 7.3% (1/2/2024), 6.8% (08/2023), 10.3% (05/25/2023), 10.1% (12/2022)  Current regimen: Xigduo 5-500 mg BID, Ozempic 1 mg/week  Past medications: metformin  Complications: neuropathy, nephropathy  Comorbidities: HTN, HLD, obesity     SMBG: once a day (random)  Mostly in the mid to high 100s     Hypoglycemia: none     Diet: three times a day  Breakfast: breakfast sandwich, greek yogurt  Lunch: sandwich/dinner leftovers  Dinner: pasta, protein  Snacks: doritos, micheal chips, olives, nuts  Drinks: unsweetened iced tea or water, wine, 3 glasses; 4-5 days per week     Exercise: no    Today:  -doing well  -had UTI/prostatitis earlier this year    ROS  General: no fever or chills  CV: no chest pain   Respiratory: no shortness of breath  MSK: no lower extremity edema  Neuro: no headache or dizziness  See HPI for Endocrine ROS    Past Medical History:   Diagnosis Date    Malignant neoplasm of lung (Multi) 02/04/2023    Personal history of nicotine dependence     Former smoker    Personal history of other specified conditions     History of abdominal pain    Positive colorectal cancer screening using Cologuard test 02/04/2023    Status post appendectomy 02/04/2023       Past Surgical History:   Procedure Laterality Date    OTHER SURGICAL HISTORY  11/27/2019    Appendectomy    OTHER SURGICAL HISTORY  06/09/2017    Wedge Resection Of Lung       Social History     Socioeconomic History    Marital status:      Spouse name: Not on file    Number of children: Not on file    Years of education: Not on file    Highest education level: Not on file   Occupational History    Not on file   Tobacco Use    Smoking status: Former     Current packs/day: 0.00     Average packs/day: 1.5  "packs/day for 18.0 years (27.0 ttl pk-yrs)     Types: Cigarettes     Start date:      Quit date:      Years since quittin.2     Passive exposure: Past    Smokeless tobacco: Never   Substance and Sexual Activity    Alcohol use: Yes     Alcohol/week: 8.0 standard drinks of alcohol     Types: 8 Glasses of wine per week    Drug use: Not Currently    Sexual activity: Not on file   Other Topics Concern    Not on file   Social History Narrative    Not on file     Social Drivers of Health     Financial Resource Strain: Not on file   Food Insecurity: Not on file   Transportation Needs: Not on file   Physical Activity: Not on file   Stress: Not on file   Social Connections: Not on file   Intimate Partner Violence: Not on file   Housing Stability: Not on file       Physical Exam   height is 1.727 m (5' 8\") and weight is 98.4 kg (217 lb). His tympanic temperature is 37 °C (98.6 °F). His blood pressure is 127/81 and his pulse is 94. His respiration is 18.   General: not in acute distress  HEENT: LILIAN ROWAN  Thyroid: no goiter  Neuro: alert and oriented x 3    Current Outpatient Medications   Medication Sig Dispense Refill    amLODIPine (Norvasc) 5 mg tablet Take 1 tablet (5 mg) by mouth once daily. 90 tablet 3    chlorthalidone (Hygroton) 25 mg tablet Take 1 tablet (25 mg) by mouth once daily. 90 tablet 3    clobetasol (Temovate) 0.05 % ointment Apply topically 2 times a day. 30 g 3    clotrimazole-betamethasone (Lotrisone) cream apply topically to affected and surrounding areas twice daily in the morning and evening 45 g 0    dapaglifloz propaned-metformin (Xigduo XR) 5-500 mg Take 1 tablet by mouth 2 times a day. 180 tablet 3    esomeprazole (NexIUM) 40 mg DR capsule Take 1 capsule (40 mg) by mouth once daily. 90 capsule 3    metoprolol succinate XL (Toprol-XL) 25 mg 24 hr tablet Take 1 tablet (25 mg) by mouth once daily. 90 tablet 3    OneTouch Delica Plus Lancet 33 gauge misc To check blood glucose once daily " 100 each 3    OneTouch Verio Flex Start meter To check blood glucose once daily 1 each 0    OneTouch Verio test strips strip To check blood glucose once daily 100 strip 3    potassium chloride ER (Micro-K) 10 mEq ER capsule Take 2 capsules (20 mEq) by mouth 2 times a day. Do not crush or chew. 360 capsule 3    rosuvastatin (Crestor) 20 mg tablet Take 1 tablet (20 mg) by mouth once daily. 90 tablet 3    semaglutide (Ozempic) 1 mg/dose (4 mg/3 mL) pen injector Inject 1 mg under the skin 1 (one) time per week. 9 mL 3     No current facility-administered medications for this visit.     Assessment and Plan  64 y.o. male with hx of type 2 diabetes, HTN, HLD, lung cancer s/p VATS wedge resection (2009), here for management of diabetes.     1. Type 2 diabetes mellitus  HbA1c: 5.9% (03/2025), 5.7% (12/2024), 6.7% (8/7/2024), 7.7% (03/2024), 7.3% (1/2/2024), 6.8% (08/2023), 10.3% (05/25/2023), 10.1% (12/2022)  Current regimen: Xigduo 5-500 mg BID, Ozempic 1 mg/week  Eye exam: no DR (06/2024)  Urine microalbumin: 109 mg/g (03/2025) on SGLT2i  Podiatry: LV 01/2023  Lipids: HDL 50, LDL 16,  (12/2024) on rosuvastatin 20 mg     A1c: 5.9%.  Maintaining excellent glycemic control.    Urine microalbumin improved from 283 to 109 mg/g after initiation of SGLT2i.  However, he had a UTI/prostatitis earlier this year.  Discussed that Xigduo (Farxiga portion) likely contributed to this. Discussed that we can discontinue now or monitor his symptoms and discontinue if he has a second episode. He prefers to stay on Xigduo for now and he will notify me or his PCP immediately if he notices any onset of UTI symptoms.   Discussed that if we discontinue Xigduo, we can resume metformin 500 mg BID and switch amlodipine to an ACE-I or ARB for the albuminuria. He is in agreement with this plan.    Occasional nerve pain in the feet. Tolerable. He does not want any additional medications at this time.    PLAN:  -continue Ozempic 1  mg/week  -continue Xigduo 5-500 mg BID  -check blood sugars once a day (alternate fasting and bedtime)  -continue rosuvastatin 20 mg QDAY    Follow-up in 3 months, then transition care to Dr. Hazel  Uses [facility].

## 2025-03-27 ENCOUNTER — PATIENT MESSAGE (OUTPATIENT)
Dept: PRIMARY CARE | Facility: CLINIC | Age: 65
End: 2025-03-27
Payer: COMMERCIAL

## 2025-03-27 DIAGNOSIS — E78.2 MIXED HYPERLIPIDEMIA: ICD-10-CM

## 2025-03-27 DIAGNOSIS — E11.9 TYPE 2 DIABETES MELLITUS WITHOUT COMPLICATION, WITHOUT LONG-TERM CURRENT USE OF INSULIN: ICD-10-CM

## 2025-03-27 DIAGNOSIS — I10 PRIMARY HYPERTENSION: Primary | ICD-10-CM

## 2025-03-27 DIAGNOSIS — R61 NIGHT SWEATS: ICD-10-CM

## 2025-03-28 LAB
ALBUMIN/CREAT UR: 109 MG/G CREAT
CREAT UR-MCNC: 81 MG/DL (ref 20–320)
EST. AVERAGE GLUCOSE BLD GHB EST-MCNC: 123 MG/DL
EST. AVERAGE GLUCOSE BLD GHB EST-SCNC: 6.8 MMOL/L
HBA1C MFR BLD: 5.9 % OF TOTAL HGB
MICROALBUMIN UR-MCNC: 8.8 MG/DL
PSA SERPL-MCNC: 4.36 NG/ML

## 2025-04-02 ENCOUNTER — APPOINTMENT (OUTPATIENT)
Dept: ENDOCRINOLOGY | Facility: CLINIC | Age: 65
End: 2025-04-02
Payer: COMMERCIAL

## 2025-04-02 VITALS
DIASTOLIC BLOOD PRESSURE: 81 MMHG | HEIGHT: 68 IN | WEIGHT: 217 LBS | TEMPERATURE: 98.6 F | BODY MASS INDEX: 32.89 KG/M2 | SYSTOLIC BLOOD PRESSURE: 127 MMHG | HEART RATE: 94 BPM | RESPIRATION RATE: 18 BRPM

## 2025-04-02 DIAGNOSIS — E78.2 MIXED HYPERLIPIDEMIA: ICD-10-CM

## 2025-04-02 DIAGNOSIS — Z79.4 TYPE 2 DIABETES MELLITUS WITH HYPERGLYCEMIA, WITH LONG-TERM CURRENT USE OF INSULIN: Primary | ICD-10-CM

## 2025-04-02 DIAGNOSIS — E11.65 TYPE 2 DIABETES MELLITUS WITH HYPERGLYCEMIA, WITH LONG-TERM CURRENT USE OF INSULIN: Primary | ICD-10-CM

## 2025-04-02 PROCEDURE — 3008F BODY MASS INDEX DOCD: CPT | Performed by: STUDENT IN AN ORGANIZED HEALTH CARE EDUCATION/TRAINING PROGRAM

## 2025-04-02 PROCEDURE — 1036F TOBACCO NON-USER: CPT | Performed by: STUDENT IN AN ORGANIZED HEALTH CARE EDUCATION/TRAINING PROGRAM

## 2025-04-02 PROCEDURE — 3079F DIAST BP 80-89 MM HG: CPT | Performed by: STUDENT IN AN ORGANIZED HEALTH CARE EDUCATION/TRAINING PROGRAM

## 2025-04-02 PROCEDURE — 3074F SYST BP LT 130 MM HG: CPT | Performed by: STUDENT IN AN ORGANIZED HEALTH CARE EDUCATION/TRAINING PROGRAM

## 2025-04-02 PROCEDURE — 99215 OFFICE O/P EST HI 40 MIN: CPT | Performed by: STUDENT IN AN ORGANIZED HEALTH CARE EDUCATION/TRAINING PROGRAM

## 2025-04-02 ASSESSMENT — PATIENT HEALTH QUESTIONNAIRE - PHQ9
1. LITTLE INTEREST OR PLEASURE IN DOING THINGS: NOT AT ALL
SUM OF ALL RESPONSES TO PHQ9 QUESTIONS 1 AND 2: 0
2. FEELING DOWN, DEPRESSED OR HOPELESS: NOT AT ALL

## 2025-04-05 LAB
ALBUMIN SERPL-MCNC: 4.5 G/DL (ref 3.6–5.1)
ALP SERPL-CCNC: 72 U/L (ref 35–144)
ALT SERPL-CCNC: 20 U/L (ref 9–46)
ANION GAP SERPL CALCULATED.4IONS-SCNC: 9 MMOL/L (CALC) (ref 7–17)
AST SERPL-CCNC: 17 U/L (ref 10–35)
BILIRUB SERPL-MCNC: 1.6 MG/DL (ref 0.2–1.2)
BUN SERPL-MCNC: 14 MG/DL (ref 7–25)
CALCIUM SERPL-MCNC: 9.5 MG/DL (ref 8.6–10.3)
CHLORIDE SERPL-SCNC: 96 MMOL/L (ref 98–110)
CHOLEST SERPL-MCNC: 100 MG/DL
CHOLEST/HDLC SERPL: 2.4 (CALC)
CO2 SERPL-SCNC: 34 MMOL/L (ref 20–32)
CREAT SERPL-MCNC: 0.94 MG/DL (ref 0.7–1.35)
EGFRCR SERPLBLD CKD-EPI 2021: 91 ML/MIN/1.73M2
ERYTHROCYTE [DISTWIDTH] IN BLOOD BY AUTOMATED COUNT: 13.2 % (ref 11–15)
GLUCOSE SERPL-MCNC: 156 MG/DL (ref 65–99)
HCT VFR BLD AUTO: 49.5 % (ref 38.5–50)
HDLC SERPL-MCNC: 41 MG/DL
HGB BLD-MCNC: 16.3 G/DL (ref 13.2–17.1)
LDLC SERPL CALC-MCNC: 29 MG/DL (CALC)
MCH RBC QN AUTO: 30.7 PG (ref 27–33)
MCHC RBC AUTO-ENTMCNC: 32.9 G/DL (ref 32–36)
MCV RBC AUTO: 93.2 FL (ref 80–100)
NONHDLC SERPL-MCNC: 59 MG/DL (CALC)
PLATELET # BLD AUTO: 260 THOUSAND/UL (ref 140–400)
PMV BLD REES-ECKER: 9.9 FL (ref 7.5–12.5)
POTASSIUM SERPL-SCNC: 3.4 MMOL/L (ref 3.5–5.3)
PROT SERPL-MCNC: 7.3 G/DL (ref 6.1–8.1)
RBC # BLD AUTO: 5.31 MILLION/UL (ref 4.2–5.8)
SODIUM SERPL-SCNC: 139 MMOL/L (ref 135–146)
TRIGL SERPL-MCNC: 237 MG/DL
WBC # BLD AUTO: 7.2 THOUSAND/UL (ref 3.8–10.8)

## 2025-04-11 ENCOUNTER — APPOINTMENT (OUTPATIENT)
Dept: PRIMARY CARE | Facility: CLINIC | Age: 65
End: 2025-04-11
Payer: COMMERCIAL

## 2025-04-11 VITALS
SYSTOLIC BLOOD PRESSURE: 102 MMHG | OXYGEN SATURATION: 96 % | HEART RATE: 64 BPM | TEMPERATURE: 97 F | WEIGHT: 216.2 LBS | BODY MASS INDEX: 32.87 KG/M2 | DIASTOLIC BLOOD PRESSURE: 66 MMHG

## 2025-04-11 DIAGNOSIS — R97.20 ELEVATED PSA: ICD-10-CM

## 2025-04-11 DIAGNOSIS — E11.69 TYPE 2 DIABETES MELLITUS WITH OTHER SPECIFIED COMPLICATION, WITHOUT LONG-TERM CURRENT USE OF INSULIN: ICD-10-CM

## 2025-04-11 DIAGNOSIS — E87.6 HYPOKALEMIA: Primary | ICD-10-CM

## 2025-04-11 DIAGNOSIS — E11.9 TYPE 2 DIABETES MELLITUS WITHOUT COMPLICATION, WITHOUT LONG-TERM CURRENT USE OF INSULIN: ICD-10-CM

## 2025-04-11 PROCEDURE — G2211 COMPLEX E/M VISIT ADD ON: HCPCS | Performed by: FAMILY MEDICINE

## 2025-04-11 PROCEDURE — 3078F DIAST BP <80 MM HG: CPT | Performed by: FAMILY MEDICINE

## 2025-04-11 PROCEDURE — 99214 OFFICE O/P EST MOD 30 MIN: CPT | Performed by: FAMILY MEDICINE

## 2025-04-11 PROCEDURE — 1036F TOBACCO NON-USER: CPT | Performed by: FAMILY MEDICINE

## 2025-04-11 PROCEDURE — 3074F SYST BP LT 130 MM HG: CPT | Performed by: FAMILY MEDICINE

## 2025-04-11 ASSESSMENT — ENCOUNTER SYMPTOMS
DEPRESSION: 0
OCCASIONAL FEELINGS OF UNSTEADINESS: 0
LOSS OF SENSATION IN FEET: 1

## 2025-04-11 NOTE — PROGRESS NOTES
"Assessment/Plan     Cholesterol at goal   Blood pressure well controlled     A1c at goal and continues to follow w/ endocrinology     Symptoms of prostatitis improved / therapy completed.   There was a slight increase in PSA from 4.06 > 4.36  This could be in light of the recent diagnosis of prostatits; will recheck in approx 3 months     Problem List Items Addressed This Visit       Hypokalemia - Primary    Type 2 diabetes mellitus with other specified complication, without long-term current use of insulin     Other Visit Diagnoses       Elevated PSA        Relevant Orders    PSA              Subjective   Patient ID: Silvestre Ortiz is a 64 y.o. male who presents for 6 Month Follow Up.    Pt w/ recent lab work:      Cholesterol 100  LDL 29     PSA 4.06 > 4.36   W/ recent treatment for prostatitis     HgB A1c 5.9%     He does follow w/ Endocrinology   From endocrinology note \"However, he had a UTI/prostatitis earlier this year.  Discussed that Xigduo (Farxiga portion) likely contributed to this. Discussed that we can discontinue now or monitor his symptoms and discontinue if he has a second episode. He prefers to stay on Xigduo for now and he will notify me or his PCP immediately if he notices any onset of UTI symptoms. \"     Blood pressure 102/66 today   At home blood pressure 120/s / 86       Objective   /66 (BP Location: Left arm, Patient Position: Sitting, BP Cuff Size: Large adult)   Pulse 64   Temp 36.1 °C (97 °F) (Skin)   Wt 98.1 kg (216 lb 3.2 oz)   SpO2 96%   BMI 32.87 kg/m²     Physical Exam:     Constitutional:   General: not in acute distress  Appearance: normal appearance, non-toxic, not ill-appearing or diaphoretic.   HENT:   Head: Normocephalic and atraumatic  Eyes: EOMI, PERRL  CV: RRR, Normal Pulses, Normal Heart sounds  Pulm: CTAB, effort normal          Dora Monroy DO     I spent a total of 19 minutes on the date of the service which included preparing to see the patient, " face-to-face patient care, completing clinical documentation, performing a medically appropriate examination, counseling and educating the patient/family/caregiver and ordering medications, tests, or procedures.

## 2025-04-28 ENCOUNTER — TELEPHONE (OUTPATIENT)
Dept: ENDOCRINOLOGY | Facility: CLINIC | Age: 65
End: 2025-04-28
Payer: COMMERCIAL

## 2025-04-28 NOTE — TELEPHONE ENCOUNTER
Prior Auth for ozempic pending determination  Submitted on 4/28 via [facility]    KEY: [id number]

## 2025-07-02 DIAGNOSIS — Z79.4 TYPE 2 DIABETES MELLITUS WITH HYPERGLYCEMIA, WITH LONG-TERM CURRENT USE OF INSULIN: Primary | ICD-10-CM

## 2025-07-02 DIAGNOSIS — E11.65 TYPE 2 DIABETES MELLITUS WITH HYPERGLYCEMIA, WITH LONG-TERM CURRENT USE OF INSULIN: Primary | ICD-10-CM

## 2025-07-11 DIAGNOSIS — R97.20 ELEVATED PSA: ICD-10-CM

## 2025-07-16 NOTE — PROGRESS NOTES
FUV for diabetes. LV with me 04/2025.    History of Present Illness   64 y.o. male with hx of type 2 diabetes, HTN, HLD, lung cancer s/p VATS wedge resection (2009).     Dx: 2021  HbA1c: 6.1% (072025), 5.9% (03/2025), 5.7% (12/2024), 6.7% (8/7/2024), 7.7% (03/2024), 7.3% (1/2/2024), 6.8% (08/2023), 10.3% (05/25/2023), 10.1% (12/2022)  Current regimen: Xigduo 5-500 mg BID, Ozempic 1 mg/week  Past medications: metformin  Complications: neuropathy, nephropathy  Comorbidities: HTN, HLD, obesity     SMBG: once a day (random)  Mostly in the mid to high 100s     Hypoglycemia: none     Diet: three times a day  Breakfast: breakfast sandwich, greek yogurt  Lunch: sandwich/dinner leftovers  Dinner: pasta, protein  Snacks: doritos, michael chips, olives, nuts  Drinks: unsweetened iced tea or water, wine, 3 glasses; 4-5 days per week     Exercise: no    Today:  -doing well  -just joined a gym!    ROS  General: no fever or chills  CV: no chest pain   Respiratory: no shortness of breath  MSK: no lower extremity edema  Neuro: no headache or dizziness  See HPI for Endocrine ROS    Past Medical History:   Diagnosis Date    Allergic When I had my apendectomy.    Anxiety Noticed several years ago, I would get a racing heartbeat in the middle of the night.  This has all since gone away.    Diabetes mellitus (Multi) Diagnosed about 3 years ago as being Type 2.    GERD (gastroesophageal reflux disease) Have had this problem for over probably 30 years.    Hypertension Probably about 5-6 years ago.  Seems to be controlled with meds but is still on the high side.    Malignant neoplasm of lung (Multi) 02/04/2023    Personal history of nicotine dependence     Former smoker    Personal history of other specified conditions     History of abdominal pain    Positive colorectal cancer screening using Cologuard test 02/04/2023    Status post appendectomy 02/04/2023    Type 2 diabetes mellitus 2022       Past Surgical History:   Procedure Laterality  "Date    APPENDECTOMY  approximately     OTHER SURGICAL HISTORY  2019    Appendectomy    OTHER SURGICAL HISTORY  2017    Wedge Resection Of Lung       Social History     Socioeconomic History    Marital status:      Spouse name: Not on file    Number of children: Not on file    Years of education: Not on file    Highest education level: Not on file   Occupational History    Not on file   Tobacco Use    Smoking status: Former     Current packs/day: 0.00     Average packs/day: 1.5 packs/day for 18.0 years (27.0 ttl pk-yrs)     Types: Cigarettes     Start date:      Quit date:      Years since quittin.5     Passive exposure: Past    Smokeless tobacco: Never   Substance and Sexual Activity    Alcohol use: Not Currently     Alcohol/week: 0.0 standard drinks of alcohol    Drug use: Not Currently    Sexual activity: Defer     Partners: Female     Birth control/protection: None   Other Topics Concern    Not on file   Social History Narrative    Not on file     Social Drivers of Health     Financial Resource Strain: Not on file   Food Insecurity: Not on file   Transportation Needs: Not on file   Physical Activity: Not on file   Stress: Not on file   Social Connections: Not on file   Intimate Partner Violence: Not on file   Housing Stability: Not on file       Physical Exam   height is 1.727 m (5' 8\") and weight is 96.1 kg (211 lb 12.8 oz). His temperature is 36.5 °C (97.7 °F). His blood pressure is 118/73 and his pulse is 85.   General: not in acute distress  HEENT: HARPAL, EOMI  Thyroid: no goiter  Neuro: alert and oriented x 3    Current Outpatient Medications   Medication Sig Dispense Refill    amLODIPine (Norvasc) 5 mg tablet Take 1 tablet (5 mg) by mouth once daily. 90 tablet 3    chlorthalidone (Hygroton) 25 mg tablet Take 1 tablet (25 mg) by mouth once daily. 90 tablet 3    clotrimazole-betamethasone (Lotrisone) cream apply topically to affected and surrounding areas twice daily in the " morning and evening 45 g 0    dapaglifloz propaned-metformin (Xigduo XR) 5-500 mg Take 1 tablet by mouth 2 times a day. 180 tablet 3    esomeprazole (NexIUM) 40 mg DR capsule Take 1 capsule (40 mg) by mouth once daily. 90 capsule 3    metoprolol succinate XL (Toprol-XL) 25 mg 24 hr tablet Take 1 tablet (25 mg) by mouth once daily. 90 tablet 3    OneTouch Delica Plus Lancet 33 gauge misc To check blood glucose once daily 100 each 3    OneTouch Verio Flex Start meter To check blood glucose once daily 1 each 0    OneTouch Verio test strips strip To check blood glucose once daily 100 strip 3    potassium chloride ER (Micro-K) 10 mEq ER capsule Take 2 capsules (20 mEq) by mouth 2 times a day. Do not crush or chew. 360 capsule 3    rosuvastatin (Crestor) 20 mg tablet Take 1 tablet (20 mg) by mouth once daily. 90 tablet 3    semaglutide (Ozempic) 1 mg/dose (4 mg/3 mL) pen injector Inject 1 mg under the skin 1 (one) time per week. 9 mL 3     No current facility-administered medications for this visit.     Assessment and Plan  64 y.o. male with hx of type 2 diabetes, HTN, HLD, lung cancer s/p VATS wedge resection (2009), here for management of diabetes.     1. Type 2 diabetes mellitus  HbA1c: 6.1% (07/2025), 5.9% (03/2025), 5.7% (12/2024), 6.7% (8/7/2024), 7.7% (03/2024), 7.3% (1/2/2024), 6.8% (08/2023), 10.3% (05/25/2023), 10.1% (12/2022)  Current regimen: Xigduo 5-500 mg BID, Ozempic 1 mg/week  Eye exam: no DR (06/2024)  Urine microalbumin: 65 mg/g (07/2025) on SGLT2i  Podiatry: LV 01/2023  Lipids: HDL 52, LDL 27,  (07/2025) on rosuvastatin 20 mg     A1c: 6.1%.  Maintaining excellent glycemic control.  Albuminuria continues to improve (peak of 283->65 mg/g) on SGLT2i.    He had a UTI/prostatitis earlier this year.  Discussed that Xigduo (Farxiga portion) likely contributed to this. Discussed that we can discontinue or monitor his symptoms and discontinue if he has a second episode. He prefers to stay on Xigduo  because it has helped improve his albuminuria and he will notify me or his PCP immediately if he notices any onset of UTI symptoms.   Discussed that if we discontinue Xigduo, we can resume metformin 500 mg BID and switch amlodipine to an ACE-I or ARB for the albuminuria. He is in agreement with this plan.    Occasional nerve pain in the feet. Tolerable. He does not want any additional medications at this time.    PLAN:  -continue Ozempic 1 mg/week  -continue Xigduo 5-500 mg BID  -check blood sugars once a day (alternate fasting and 2h after dinner)  -continue rosuvastatin 20 mg QDAY  -due for podiatry/eye exam    Follow-up in 4 months with Hellen Langley PA-C, then another 4 [facility]

## 2025-07-17 ENCOUNTER — PATIENT MESSAGE (OUTPATIENT)
Dept: PRIMARY CARE | Facility: CLINIC | Age: 65
End: 2025-07-17
Payer: COMMERCIAL

## 2025-07-17 DIAGNOSIS — I10 PRIMARY HYPERTENSION: ICD-10-CM

## 2025-07-17 DIAGNOSIS — E87.6 HYPOKALEMIA: ICD-10-CM

## 2025-07-17 DIAGNOSIS — E11.9 TYPE 2 DIABETES MELLITUS WITHOUT COMPLICATION, WITHOUT LONG-TERM CURRENT USE OF INSULIN: Primary | ICD-10-CM

## 2025-07-17 DIAGNOSIS — E78.2 MIXED HYPERLIPIDEMIA: ICD-10-CM

## 2025-07-17 DIAGNOSIS — R53.83 OTHER FATIGUE: ICD-10-CM

## 2025-07-24 ENCOUNTER — TELEPHONE (OUTPATIENT)
Dept: ENDOCRINOLOGY | Facility: CLINIC | Age: 65
End: 2025-07-24
Payer: COMMERCIAL

## 2025-07-24 LAB
ALBUMIN SERPL-MCNC: 4.9 G/DL (ref 3.6–5.1)
ALP SERPL-CCNC: 76 U/L (ref 35–144)
ALT SERPL-CCNC: 20 U/L (ref 9–46)
ANION GAP SERPL CALCULATED.4IONS-SCNC: 11 MMOL/L (CALC) (ref 7–17)
AST SERPL-CCNC: 17 U/L (ref 10–35)
BASOPHILS # BLD AUTO: 50 CELLS/UL (ref 0–200)
BASOPHILS NFR BLD AUTO: 0.8 %
BILIRUB SERPL-MCNC: 1.8 MG/DL (ref 0.2–1.2)
BUN SERPL-MCNC: 14 MG/DL (ref 7–25)
CALCIUM SERPL-MCNC: 9.8 MG/DL (ref 8.6–10.3)
CHLORIDE SERPL-SCNC: 96 MMOL/L (ref 98–110)
CHOLEST SERPL-MCNC: 108 MG/DL
CHOLEST/HDLC SERPL: 2.1 (CALC)
CO2 SERPL-SCNC: 31 MMOL/L (ref 20–32)
CREAT SERPL-MCNC: 0.9 MG/DL (ref 0.7–1.35)
EGFRCR SERPLBLD CKD-EPI 2021: 96 ML/MIN/1.73M2
EOSINOPHIL # BLD AUTO: 88 CELLS/UL (ref 15–500)
EOSINOPHIL NFR BLD AUTO: 1.4 %
ERYTHROCYTE [DISTWIDTH] IN BLOOD BY AUTOMATED COUNT: 14.5 % (ref 11–15)
GLUCOSE SERPL-MCNC: 121 MG/DL (ref 65–99)
HCT VFR BLD AUTO: 49.9 % (ref 38.5–50)
HDLC SERPL-MCNC: 52 MG/DL
HGB BLD-MCNC: 16.9 G/DL (ref 13.2–17.1)
LDLC SERPL CALC-MCNC: 27 MG/DL (CALC)
LYMPHOCYTES # BLD AUTO: 2085 CELLS/UL (ref 850–3900)
LYMPHOCYTES NFR BLD AUTO: 33.1 %
MCH RBC QN AUTO: 30.7 PG (ref 27–33)
MCHC RBC AUTO-ENTMCNC: 33.9 G/DL (ref 32–36)
MCV RBC AUTO: 90.6 FL (ref 80–100)
MONOCYTES # BLD AUTO: 422 CELLS/UL (ref 200–950)
MONOCYTES NFR BLD AUTO: 6.7 %
NEUTROPHILS # BLD AUTO: 3654 CELLS/UL (ref 1500–7800)
NEUTROPHILS NFR BLD AUTO: 58 %
NONHDLC SERPL-MCNC: 56 MG/DL (CALC)
PLATELET # BLD AUTO: 236 THOUSAND/UL (ref 140–400)
PMV BLD REES-ECKER: 10.2 FL (ref 7.5–12.5)
POTASSIUM SERPL-SCNC: 3.5 MMOL/L (ref 3.5–5.3)
PROT SERPL-MCNC: 7.7 G/DL (ref 6.1–8.1)
PSA SERPL-MCNC: 2.07 NG/ML
RBC # BLD AUTO: 5.51 MILLION/UL (ref 4.2–5.8)
SODIUM SERPL-SCNC: 138 MMOL/L (ref 135–146)
TRIGL SERPL-MCNC: 232 MG/DL
WBC # BLD AUTO: 6.3 THOUSAND/UL (ref 3.8–10.8)

## 2025-07-24 NOTE — TELEPHONE ENCOUNTER
Prior Auth for xigduo pending determination  Submitted on 7/24 via [facility]    KEY: [id number]

## 2025-07-25 LAB
ALBUMIN/CREAT UR: 65 MG/G CREAT
CREAT UR-MCNC: 62 MG/DL (ref 20–320)
EST. AVERAGE GLUCOSE BLD GHB EST-MCNC: 128 MG/DL
EST. AVERAGE GLUCOSE BLD GHB EST-SCNC: 7.1 MMOL/L
HBA1C MFR BLD: 6.1 %
MICROALBUMIN UR-MCNC: 4 MG/DL

## 2025-07-29 ENCOUNTER — TELEPHONE (OUTPATIENT)
Dept: ENDOCRINOLOGY | Facility: CLINIC | Age: 65
End: 2025-07-29
Payer: COMMERCIAL

## 2025-07-29 NOTE — TELEPHONE ENCOUNTER
spoke with patient appt reminder. Patient is using the one touch verio he will bring it to the appt to upload.

## 2025-07-30 ENCOUNTER — APPOINTMENT (OUTPATIENT)
Dept: ENDOCRINOLOGY | Facility: CLINIC | Age: 65
End: 2025-07-30
Payer: COMMERCIAL

## 2025-07-30 VITALS
HEIGHT: 68 IN | SYSTOLIC BLOOD PRESSURE: 118 MMHG | WEIGHT: 211.8 LBS | BODY MASS INDEX: 32.1 KG/M2 | DIASTOLIC BLOOD PRESSURE: 73 MMHG | TEMPERATURE: 97.7 F | HEART RATE: 85 BPM

## 2025-07-30 DIAGNOSIS — K21.9 GASTROESOPHAGEAL REFLUX DISEASE, UNSPECIFIED WHETHER ESOPHAGITIS PRESENT: ICD-10-CM

## 2025-07-30 PROCEDURE — 3074F SYST BP LT 130 MM HG: CPT | Performed by: STUDENT IN AN ORGANIZED HEALTH CARE EDUCATION/TRAINING PROGRAM

## 2025-07-30 PROCEDURE — 3078F DIAST BP <80 MM HG: CPT | Performed by: STUDENT IN AN ORGANIZED HEALTH CARE EDUCATION/TRAINING PROGRAM

## 2025-07-30 PROCEDURE — 3008F BODY MASS INDEX DOCD: CPT | Performed by: STUDENT IN AN ORGANIZED HEALTH CARE EDUCATION/TRAINING PROGRAM

## 2025-07-30 PROCEDURE — 99214 OFFICE O/P EST MOD 30 MIN: CPT | Performed by: STUDENT IN AN ORGANIZED HEALTH CARE EDUCATION/TRAINING PROGRAM

## 2025-07-30 ASSESSMENT — PATIENT HEALTH QUESTIONNAIRE - PHQ9
2. FEELING DOWN, DEPRESSED OR HOPELESS: NOT AT ALL
SUM OF ALL RESPONSES TO PHQ9 QUESTIONS 1 AND 2: 0
1. LITTLE INTEREST OR PLEASURE IN DOING THINGS: NOT AT ALL

## 2025-07-30 ASSESSMENT — ENCOUNTER SYMPTOMS
LOSS OF SENSATION IN FEET: 1
DEPRESSION: 0
OCCASIONAL FEELINGS OF UNSTEADINESS: 0

## 2025-07-30 ASSESSMENT — PAIN SCALES - GENERAL: PAINLEVEL_OUTOF10: 0-NO PAIN

## 2025-08-13 ENCOUNTER — OFFICE VISIT (OUTPATIENT)
Dept: SURGERY | Facility: HOSPITAL | Age: 65
End: 2025-08-13
Payer: COMMERCIAL

## 2025-08-13 ENCOUNTER — HOSPITAL ENCOUNTER (OUTPATIENT)
Dept: RADIOLOGY | Facility: HOSPITAL | Age: 65
Discharge: HOME | End: 2025-08-13
Payer: COMMERCIAL

## 2025-08-13 VITALS
SYSTOLIC BLOOD PRESSURE: 130 MMHG | HEART RATE: 85 BPM | WEIGHT: 212.7 LBS | RESPIRATION RATE: 18 BRPM | OXYGEN SATURATION: 100 % | DIASTOLIC BLOOD PRESSURE: 75 MMHG | BODY MASS INDEX: 32.34 KG/M2 | TEMPERATURE: 96.3 F

## 2025-08-13 DIAGNOSIS — C34.90 MALIGNANT NEOPLASM OF LUNG, UNSPECIFIED LATERALITY, UNSPECIFIED PART OF LUNG (MULTI): ICD-10-CM

## 2025-08-13 PROCEDURE — 3075F SYST BP GE 130 - 139MM HG: CPT | Performed by: THORACIC SURGERY (CARDIOTHORACIC VASCULAR SURGERY)

## 2025-08-13 PROCEDURE — 71250 CT THORAX DX C-: CPT | Performed by: STUDENT IN AN ORGANIZED HEALTH CARE EDUCATION/TRAINING PROGRAM

## 2025-08-13 PROCEDURE — 3078F DIAST BP <80 MM HG: CPT | Performed by: THORACIC SURGERY (CARDIOTHORACIC VASCULAR SURGERY)

## 2025-08-13 PROCEDURE — 71250 CT THORAX DX C-: CPT

## 2025-08-13 PROCEDURE — 99214 OFFICE O/P EST MOD 30 MIN: CPT | Performed by: THORACIC SURGERY (CARDIOTHORACIC VASCULAR SURGERY)

## 2025-08-13 PROCEDURE — 99214 OFFICE O/P EST MOD 30 MIN: CPT | Mod: 25 | Performed by: THORACIC SURGERY (CARDIOTHORACIC VASCULAR SURGERY)

## 2025-08-13 ASSESSMENT — PAIN SCALES - GENERAL: PAINLEVEL_OUTOF10: 0-NO PAIN

## 2025-10-10 ENCOUNTER — APPOINTMENT (OUTPATIENT)
Dept: PRIMARY CARE | Facility: CLINIC | Age: 65
End: 2025-10-10
Payer: COMMERCIAL

## 2025-12-11 ENCOUNTER — APPOINTMENT (OUTPATIENT)
Dept: ENDOCRINOLOGY | Facility: CLINIC | Age: 65
End: 2025-12-11
Payer: COMMERCIAL

## 2026-04-02 ENCOUNTER — APPOINTMENT (OUTPATIENT)
Dept: ENDOCRINOLOGY | Facility: CLINIC | Age: 66
End: 2026-04-02
Payer: COMMERCIAL